# Patient Record
Sex: FEMALE | Race: WHITE | NOT HISPANIC OR LATINO | Employment: OTHER | URBAN - METROPOLITAN AREA
[De-identification: names, ages, dates, MRNs, and addresses within clinical notes are randomized per-mention and may not be internally consistent; named-entity substitution may affect disease eponyms.]

---

## 2017-04-13 ENCOUNTER — OFFICE VISIT (OUTPATIENT)
Dept: FAMILY MEDICINE | Facility: CLINIC | Age: 72
End: 2017-04-13
Payer: MEDICARE

## 2017-04-13 VITALS
RESPIRATION RATE: 18 BRPM | SYSTOLIC BLOOD PRESSURE: 120 MMHG | HEART RATE: 86 BPM | WEIGHT: 136.25 LBS | BODY MASS INDEX: 25.72 KG/M2 | OXYGEN SATURATION: 98 % | TEMPERATURE: 98 F | HEIGHT: 61 IN | DIASTOLIC BLOOD PRESSURE: 84 MMHG

## 2017-04-13 DIAGNOSIS — J30.1 SEASONAL ALLERGIC RHINITIS DUE TO POLLEN: Chronic | ICD-10-CM

## 2017-04-13 DIAGNOSIS — G47.30 SLEEP APNEA, UNSPECIFIED TYPE: Primary | ICD-10-CM

## 2017-04-13 DIAGNOSIS — E78.00 HYPERCHOLESTEROLEMIA: Chronic | ICD-10-CM

## 2017-04-13 DIAGNOSIS — F33.1 MODERATE EPISODE OF RECURRENT MAJOR DEPRESSIVE DISORDER: Chronic | ICD-10-CM

## 2017-04-13 DIAGNOSIS — K59.03 DRUG-INDUCED CONSTIPATION: Chronic | ICD-10-CM

## 2017-04-13 DIAGNOSIS — I10 ESSENTIAL HYPERTENSION: Chronic | ICD-10-CM

## 2017-04-13 DIAGNOSIS — F51.04 PSYCHOPHYSIOLOGICAL INSOMNIA: ICD-10-CM

## 2017-04-13 DIAGNOSIS — F41.9 ANXIETY: Chronic | ICD-10-CM

## 2017-04-13 PROCEDURE — 99204 OFFICE O/P NEW MOD 45 MIN: CPT | Mod: S$GLB,,, | Performed by: FAMILY MEDICINE

## 2017-04-13 PROCEDURE — 1125F AMNT PAIN NOTED PAIN PRSNT: CPT | Mod: S$GLB,,, | Performed by: FAMILY MEDICINE

## 2017-04-13 PROCEDURE — 3074F SYST BP LT 130 MM HG: CPT | Mod: S$GLB,,, | Performed by: FAMILY MEDICINE

## 2017-04-13 PROCEDURE — 3079F DIAST BP 80-89 MM HG: CPT | Mod: S$GLB,,, | Performed by: FAMILY MEDICINE

## 2017-04-13 PROCEDURE — 99999 PR PBB SHADOW E&M-NEW PATIENT-LVL IV: CPT | Mod: PBBFAC,,, | Performed by: FAMILY MEDICINE

## 2017-04-13 PROCEDURE — 1159F MED LIST DOCD IN RCRD: CPT | Mod: S$GLB,,, | Performed by: FAMILY MEDICINE

## 2017-04-13 PROCEDURE — 1160F RVW MEDS BY RX/DR IN RCRD: CPT | Mod: S$GLB,,, | Performed by: FAMILY MEDICINE

## 2017-04-13 PROCEDURE — 1157F ADVNC CARE PLAN IN RCRD: CPT | Mod: S$GLB,,, | Performed by: FAMILY MEDICINE

## 2017-04-13 RX ORDER — LUBIPROSTONE 24 UG/1
24 CAPSULE, GELATIN COATED ORAL 2 TIMES DAILY WITH MEALS
Qty: 30 CAPSULE | Refills: 0 | Status: SHIPPED | OUTPATIENT
Start: 2017-04-13 | End: 2017-04-13 | Stop reason: SDUPTHER

## 2017-04-13 RX ORDER — LORAZEPAM 1 MG/1
1 TABLET ORAL 2 TIMES DAILY
Qty: 60 TABLET | Refills: 0 | Status: SHIPPED | OUTPATIENT
Start: 2017-04-13 | End: 2017-04-13 | Stop reason: SDUPTHER

## 2017-04-13 RX ORDER — LUBIPROSTONE 24 UG/1
24 CAPSULE, GELATIN COATED ORAL 2 TIMES DAILY WITH MEALS
COMMUNITY
Start: 2017-03-15 | End: 2017-04-13 | Stop reason: SDUPTHER

## 2017-04-13 RX ORDER — PANTOPRAZOLE SODIUM 40 MG/1
40 TABLET, DELAYED RELEASE ORAL DAILY
COMMUNITY
Start: 2017-02-17 | End: 2017-04-13 | Stop reason: SDUPTHER

## 2017-04-13 RX ORDER — MONTELUKAST SODIUM 10 MG/1
10 TABLET ORAL DAILY
Qty: 90 TABLET | Refills: 3 | Status: SHIPPED | OUTPATIENT
Start: 2017-04-13 | End: 2018-03-12 | Stop reason: SDUPTHER

## 2017-04-13 RX ORDER — VENLAFAXINE HYDROCHLORIDE 75 MG/1
225 CAPSULE, EXTENDED RELEASE ORAL DAILY
Qty: 270 CAPSULE | Refills: 3 | Status: SHIPPED | OUTPATIENT
Start: 2017-04-13 | End: 2017-04-17 | Stop reason: SDUPTHER

## 2017-04-13 RX ORDER — LUBIPROSTONE 24 UG/1
24 CAPSULE, GELATIN COATED ORAL 2 TIMES DAILY WITH MEALS
Qty: 90 CAPSULE | Refills: 3 | Status: SHIPPED | OUTPATIENT
Start: 2017-04-13 | End: 2017-04-17 | Stop reason: SDUPTHER

## 2017-04-13 RX ORDER — ATORVASTATIN CALCIUM 40 MG/1
40 TABLET, FILM COATED ORAL DAILY
Qty: 90 TABLET | Refills: 3 | Status: SHIPPED | OUTPATIENT
Start: 2017-04-13 | End: 2018-04-22 | Stop reason: SDUPTHER

## 2017-04-13 RX ORDER — CARVEDILOL 12.5 MG/1
12.5 TABLET ORAL 2 TIMES DAILY
COMMUNITY
Start: 2017-02-15 | End: 2017-04-13 | Stop reason: SDUPTHER

## 2017-04-13 RX ORDER — TRAMADOL HYDROCHLORIDE 50 MG/1
TABLET ORAL
COMMUNITY
Start: 2017-02-28

## 2017-04-13 RX ORDER — VALSARTAN AND HYDROCHLOROTHIAZIDE 320; 25 MG/1; MG/1
1 TABLET, FILM COATED ORAL DAILY
Qty: 90 TABLET | Refills: 3 | Status: SHIPPED | OUTPATIENT
Start: 2017-04-13 | End: 2018-03-20 | Stop reason: SDUPTHER

## 2017-04-13 RX ORDER — LORAZEPAM 1 MG/1
1 TABLET ORAL 2 TIMES DAILY
COMMUNITY
Start: 2017-03-15 | End: 2017-04-13 | Stop reason: SDUPTHER

## 2017-04-13 RX ORDER — VENLAFAXINE HYDROCHLORIDE 75 MG/1
225 CAPSULE, EXTENDED RELEASE ORAL DAILY
Qty: 90 CAPSULE | Refills: 0 | Status: SHIPPED | OUTPATIENT
Start: 2017-04-13 | End: 2017-04-13 | Stop reason: SDUPTHER

## 2017-04-13 RX ORDER — LORAZEPAM 1 MG/1
1 TABLET ORAL 2 TIMES DAILY
Qty: 180 TABLET | Refills: 1 | Status: SHIPPED | OUTPATIENT
Start: 2017-04-13 | End: 2017-09-06 | Stop reason: SDUPTHER

## 2017-04-13 RX ORDER — CARVEDILOL 12.5 MG/1
12.5 TABLET ORAL 2 TIMES DAILY
Qty: 90 TABLET | Refills: 3 | Status: SHIPPED | OUTPATIENT
Start: 2017-04-13 | End: 2017-09-08 | Stop reason: SDUPTHER

## 2017-04-13 RX ORDER — PANTOPRAZOLE SODIUM 40 MG/1
40 TABLET, DELAYED RELEASE ORAL DAILY
Qty: 90 TABLET | Refills: 3 | Status: SHIPPED | OUTPATIENT
Start: 2017-04-13 | End: 2018-03-12 | Stop reason: SDUPTHER

## 2017-04-13 RX ORDER — MONTELUKAST SODIUM 10 MG/1
10 TABLET ORAL DAILY
COMMUNITY
Start: 2017-03-22 | End: 2017-04-13 | Stop reason: SDUPTHER

## 2017-04-13 RX ORDER — VENLAFAXINE HYDROCHLORIDE 150 MG/1
1 CAPSULE, EXTENDED RELEASE ORAL DAILY
COMMUNITY
Start: 2017-01-29 | End: 2017-04-13 | Stop reason: SDUPTHER

## 2017-04-13 RX ORDER — ATORVASTATIN CALCIUM 40 MG/1
40 TABLET, FILM COATED ORAL DAILY
COMMUNITY
Start: 2017-02-24 | End: 2017-04-13 | Stop reason: SDUPTHER

## 2017-04-13 RX ORDER — VALSARTAN AND HYDROCHLOROTHIAZIDE 320; 25 MG/1; MG/1
1 TABLET, FILM COATED ORAL DAILY
COMMUNITY
Start: 2017-01-25 | End: 2017-04-13 | Stop reason: SDUPTHER

## 2017-04-13 NOTE — PATIENT INSTRUCTIONS
Allergic Rhinitis  Allergic rhinitis is an allergic reaction that affects the nose, and often the eyes. Its often known as nasal allergies. Nasal allergies are often due to things in the environment that are breathed in. Depending what you are sensitive to, nasal allergies may occur only during certain seasons. Or they may occur year round. Common indoor allergens include house dust mites, mold, cockroaches, and pet dander. Outdoor allergens include pollen from trees, grasses, and weeds.   Symptoms include a drippy, stuffy, and itchy nose. They also include sneezing and red and itchy eyes. You may feel tired more often. Severe allergies may also affect your breathing and trigger a condition called asthma.   Tests can be done to see what allergens are affecting you. You may be referred to an allergy specialist for testing and further evaluation.  Home care  The healthcare provider may prescribe medicines to help relieve allergy symptoms.   Ask the provider for advice on how to avoid substances that you are allergic to. Below are a few tips for each type of allergen.  Pet dander:  · Do not have pets with fur and feathers.  · If you cannot avoid having a pet, keep it out of your bedroom and off upholstered furniture.  Pollen:  · When pollen counts are high, keep windows of your car and home closed. If possible, use an air conditioner instead.  · Wear a filter mask when mowing or doing yard work.  House dust mites:  · Wash bedding every week in warm water and detergent and dry on a hot setting.  · Cover the mattress, box spring, and pillows with allergy covers.   · If possible, sleep in a room with no carpet, curtains, or upholstered furniture.  Cockroaches:  · Store food in sealed containers.  · Remove garbage from the home promptly.  · Fix water leaks  Mold:  · Keep humidity low by using a dehumidifier or air conditioner. Keep the dehumidifier and air conditioner clean and free of mold.  · Clean moldy areas with  bleach and water.  In general:  · Vacuum once or twice a week. If possible, use a vacuum with a high-efficiency particulate air (HEPA) filter.  · Do not smoke. Avoid cigarette smoke. Cigarette smoke is an irritant that can make symptoms worse.  Follow-up care  Follow up as advised by the health care provider or our staff. If you were referred to an allergy specialist, make this appointment promptly.  When to seek medical advice  Call your healthcare provider right away if the following occur:  · Coughing or wheezing  · Fever greater than 100.4°F (38°C)  · Continuing symptoms, new symptoms, or worsening symptoms  Call 911 right away if you have:  · Trouble breathing  · Hives (raised red bumps)  · Severe swelling of the face or severe itching of the eyes or mouth  Date Last Reviewed: 4/26/2015  © 5285-0817 [employer]. [address] [place] [address], [place] [address]. All rights reserved. This information is not intended as a substitute for professional medical care. Always follow your healthcare professional's instructions.

## 2017-04-13 NOTE — MR AVS SNAPSHOT
[facility]  [address]  [place] [address]  Phone: [phone]                  Yara Pradhan   2017 11:00 AM   Office Visit    Description:  Female : 1945   Provider:  Irina Womack MD   Department:  [facility]           Reason for Visit     Establish Care     Medication Refill           Diagnoses this Visit        Comments    Sleep apnea, unspecified type    -  Primary     Essential hypertension         Moderate episode of recurrent major depressive disorder         Seasonal allergic rhinitis due to pollen         Drug-induced constipation         Anxiety         Hypercholesterolemia                To Do List           Goals (5 Years of Data)     None       These Medications        Disp Refills Start End    AMITIZA 24 mcg Cap 90 capsule 3 2017     Take 1 capsule (24 mcg total) by mouth 2 (two) times daily with meals. - Oral    Pharmacy: [facility] - [address] Ph #: [phone]       atorvastatin (LIPITOR) 40 MG tablet 90 tablet 3 2017     Take 1 tablet (40 mg total) by mouth once daily. - Oral    Pharmacy: [facility] - [address] Ph #: [phone]       carvedilol (COREG) 12.5 MG tablet 90 tablet 3 2017     Take 1 tablet (12.5 mg total) by mouth 2 (two) times daily. - Oral    Pharmacy: [facility] - [address] Ph #: [phone]       lorazepam (ATIVAN) 1 MG tablet 180 tablet 1 2017     Take 1 tablet (1 mg total) by mouth 2 (two) times daily. - Oral    Pharmacy: [facility] - [address] Ph #: [phone]       montelukast (SINGULAIR) 10 mg tablet 90 tablet 3 2017     Take 1 tablet (10 mg total) by mouth once daily. - Oral    Pharmacy: [facility] - [address] Ph #: [phone]        pantoprazole (PROTONIX) 40 MG tablet 90 tablet 3 4/13/2017     Take 1 tablet (40 mg total) by mouth once daily. - Oral    Pharmacy: [facility] - [facility] [address] #: [phone]       valsartan-hydrochlorothiazide (DIOVAN-HCT) 320-25 mg per tablet 90 tablet 3 4/13/2017     Take 1 tablet by mouth once daily. - Oral    Pharmacy: [facility] - [facility] [address] Ph #: [phone]       venlafaxine (EFFEXOR-XR) 75 MG 24 hr capsule 270 capsule 3 4/13/2017     Take 3 capsules (225 mg total) by mouth once daily. - Oral    Pharmacy: [facility] - [facility] [address] #: [phone]         [facility]     [facility] On Call Nurse Care Line - 24/7 Assistance  Unless otherwise directed by your provider, please contact [facility], our nurse care line that is available for 24/7 assistance.     Registered nurses in the [facility] provide: appointment scheduling, clinical advisement, health education, and other advisory services.  Call: [phone] (toll free)               Medications           Message regarding Medications     Verify the changes and/or additions to your medication regime listed below are the same as discussed with your clinician today.  If any of these changes or additions are incorrect, please notify your healthcare provider.        START taking these NEW medications        Refills    AMITIZA 24 mcg Cap 3    Sig: Take 1 capsule (24 mcg total) by mouth 2 (two) times daily with meals.    Class: Print    Route: Oral    atorvastatin (LIPITOR) 40 MG tablet 3    Sig: Take 1 tablet (40 mg total) by mouth once daily.    Class: Print    Route: Oral    carvedilol (COREG) 12.5 MG tablet 3    Sig: Take 1 tablet (12.5 mg total) by mouth 2 (two) times daily.    Class: Normal    Route: Oral    lorazepam (ATIVAN) 1 MG tablet 1    Sig: Take 1 tablet (1 mg total) by mouth 2 (two) times daily.  "   Class: Print    Route: Oral    montelukast (SINGULAIR) 10 mg tablet 3    Sig: Take 1 tablet (10 mg total) by mouth once daily.    Class: Print    Route: Oral    pantoprazole (PROTONIX) 40 MG tablet 3    Sig: Take 1 tablet (40 mg total) by mouth once daily.    Class: Print    Route: Oral    valsartan-hydrochlorothiazide (DIOVAN-HCT) 320-25 mg per tablet 3    Sig: Take 1 tablet by mouth once daily.    Class: Print    Route: Oral    venlafaxine (EFFEXOR-XR) 75 MG 24 hr capsule 3    Sig: Take 3 capsules (225 mg total) by mouth once daily.    Class: Print    Route: Oral           Verify that the below list of medications is an accurate representation of the medications you are currently taking.  If none reported, the list may be blank. If incorrect, please contact your healthcare provider. Carry this list with you in case of emergency.           Current Medications     AMITIZA 24 mcg Cap Take 1 capsule (24 mcg total) by mouth 2 (two) times daily with meals.    atorvastatin (LIPITOR) 40 MG tablet Take 1 tablet (40 mg total) by mouth once daily.    carvedilol (COREG) 12.5 MG tablet Take 1 tablet (12.5 mg total) by mouth 2 (two) times daily.    lorazepam (ATIVAN) 1 MG tablet Take 1 tablet (1 mg total) by mouth 2 (two) times daily.    montelukast (SINGULAIR) 10 mg tablet Take 1 tablet (10 mg total) by mouth once daily.    pantoprazole (PROTONIX) 40 MG tablet Take 1 tablet (40 mg total) by mouth once daily.    tramadol (ULTRAM) 50 mg tablet     valsartan-hydrochlorothiazide (DIOVAN-HCT) 320-25 mg per tablet Take 1 tablet by mouth once daily.    venlafaxine (EFFEXOR-XR) 75 MG 24 hr capsule Take 3 capsules (225 mg total) by mouth once daily.           Clinical Reference Information           Your Vitals Were     BP Pulse Temp Resp Height Weight    120/84 86 97.7 °F (36.5 °C) (Tympanic) 18 5' 0.5" (1.537 m) 61.8 kg (136 lb 3.9 oz)    SpO2 BMI             98% 26.17 kg/m2         Blood Pressure          Most Recent Value    BP  " 120/84      Allergies as of 4/13/2017     Lortab [Hydrocodone-acetaminophen]    Codeine    Percocet [Oxycodone-acetaminophen]      Immunizations Administered on Date of Encounter - 4/13/2017     None      Orders Placed During Today's Visit      Normal Orders This Visit    Ambulatory consult to Sleep Disorders       [facility] Sign-Up     Activating your [facility] account is as easy as 1-2-3!     1) Visit [email or web address], select Sign Up Now, enter this activation code and your date of birth, then select Next.  [email or web address]  Expires: 5/28/2017 12:33 PM      2) Create a username and password to use when you visit [facility] in the future and select a security question in case you lose your password and select Next.    3) Enter your e-mail address and click Sign Up!    Additional Information  If you have questions, please e-mail [email or web address] or call [phone] to talk to our [facility] staff. Remember, [facility] is NOT to be used for urgent needs. For medical emergencies, dial 911.         Smoking Cessation     If you would like to quit smoking:   You may be eligible for free services if you are a [place] resident and started smoking cigarettes before September 1, 1988.  Call the Smoking Cessation Trust (SCT) toll free at (992) 146-3529 or (633) 417-0256.   Call 1-800-QUIT-NOW if you do not meet the above criteria.   Contact us via email: [email or web address] ([employer])   View our website for more information: [email or web address]        Language Assistance Services     ATTENTION: Language assistance services are available, free of charge. Please call [phone].      ATENCIÓN: Si habla español, tiene a peralta disposición servicios gratuitos de asistencia lingüística. Llame al [phone].     CHÚ Ý: N?u b?n nói Ti?ng Vi?t, có các d?ch v? h? tr? ngôn ng? mi?n phí dành cho b?n. G?i s? [phone].         [facility] complies with applicable Federal civil rights laws and does not  discriminate on the basis of race, color, national origin, age, disability, or sex.
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
## 2017-04-17 DIAGNOSIS — F33.1 MODERATE EPISODE OF RECURRENT MAJOR DEPRESSIVE DISORDER: Chronic | ICD-10-CM

## 2017-04-17 DIAGNOSIS — K59.03 DRUG-INDUCED CONSTIPATION: Chronic | ICD-10-CM

## 2017-04-17 RX ORDER — VENLAFAXINE HYDROCHLORIDE 75 MG/1
225 CAPSULE, EXTENDED RELEASE ORAL DAILY
Qty: 90 CAPSULE | Refills: 0 | Status: SHIPPED | OUTPATIENT
Start: 2017-04-17 | End: 2018-06-25 | Stop reason: SDUPTHER

## 2017-04-17 RX ORDER — LUBIPROSTONE 24 UG/1
24 CAPSULE, GELATIN COATED ORAL 2 TIMES DAILY WITH MEALS
Qty: 30 CAPSULE | Refills: 0 | Status: SHIPPED | OUTPATIENT
Start: 2017-04-17 | End: 2017-05-17

## 2017-04-17 NOTE — TELEPHONE ENCOUNTER
----- Message from Ebony Mojica sent at 4/17/2017  8:48 AM CDT -----  Pt at [phone]//states she saw  last week and 2 prescriptions were suppose to be called in//pharmacy has not received them//med for constipation(does not remember name) and Effexor//uses//[facility] [address]//please call//janice/korina

## 2017-05-15 ENCOUNTER — OFFICE VISIT (OUTPATIENT)
Dept: PULMONOLOGY | Facility: CLINIC | Age: 72
End: 2017-05-15
Payer: MEDICARE

## 2017-05-15 VITALS
SYSTOLIC BLOOD PRESSURE: 108 MMHG | HEART RATE: 56 BPM | RESPIRATION RATE: 18 BRPM | DIASTOLIC BLOOD PRESSURE: 64 MMHG | BODY MASS INDEX: 27.52 KG/M2 | WEIGHT: 140.19 LBS | HEIGHT: 60 IN | OXYGEN SATURATION: 97 %

## 2017-05-15 DIAGNOSIS — E78.00 HYPERCHOLESTEROLEMIA: ICD-10-CM

## 2017-05-15 DIAGNOSIS — I10 ESSENTIAL HYPERTENSION: ICD-10-CM

## 2017-05-15 DIAGNOSIS — G47.33 OBSTRUCTIVE SLEEP APNEA SYNDROME: Primary | ICD-10-CM

## 2017-05-15 DIAGNOSIS — F33.1 MODERATE EPISODE OF RECURRENT MAJOR DEPRESSIVE DISORDER: ICD-10-CM

## 2017-05-15 DIAGNOSIS — F51.04 PSYCHOPHYSIOLOGICAL INSOMNIA: ICD-10-CM

## 2017-05-15 DIAGNOSIS — F17.200 TOBACCO USE DISORDER: ICD-10-CM

## 2017-05-15 PROCEDURE — 3074F SYST BP LT 130 MM HG: CPT | Mod: S$GLB,,, | Performed by: NURSE PRACTITIONER

## 2017-05-15 PROCEDURE — 99999 PR PBB SHADOW E&M-EST. PATIENT-LVL IV: CPT | Mod: PBBFAC,,, | Performed by: NURSE PRACTITIONER

## 2017-05-15 PROCEDURE — 1159F MED LIST DOCD IN RCRD: CPT | Mod: S$GLB,,, | Performed by: NURSE PRACTITIONER

## 2017-05-15 PROCEDURE — 1160F RVW MEDS BY RX/DR IN RCRD: CPT | Mod: S$GLB,,, | Performed by: NURSE PRACTITIONER

## 2017-05-15 PROCEDURE — 99214 OFFICE O/P EST MOD 30 MIN: CPT | Mod: S$GLB,,, | Performed by: NURSE PRACTITIONER

## 2017-05-15 PROCEDURE — 3078F DIAST BP <80 MM HG: CPT | Mod: S$GLB,,, | Performed by: NURSE PRACTITIONER

## 2017-05-15 RX ORDER — TITANIUM DIOXIDE, OCTINOXATE, ZINC OXIDE 4.61; 1.6; .78 G/40ML; G/40ML; G/40ML
1 CREAM TOPICAL
COMMUNITY

## 2017-05-15 RX ORDER — ASPIRIN 81 MG/1
81 TABLET ORAL DAILY
COMMUNITY

## 2017-05-15 RX ORDER — AMOXICILLIN 500 MG
2 CAPSULE ORAL DAILY
COMMUNITY

## 2017-05-15 RX ORDER — DOXYCYCLINE HYCLATE 200 MG/1
200 TABLET, DELAYED RELEASE ORAL 2 TIMES DAILY
COMMUNITY
End: 2018-09-27

## 2017-05-15 RX ORDER — CHOLECALCIFEROL (VITAMIN D3) 25 MCG
1000 TABLET ORAL DAILY
COMMUNITY

## 2017-05-15 RX ORDER — ASCORBIC ACID 500 MG
500 TABLET ORAL DAILY
COMMUNITY

## 2017-05-15 NOTE — PATIENT INSTRUCTIONS
Continuous Positive Air Pressure (CPAP)  Your provider has scheduled you for a sleep study.   You should be receiving a phone call from the sleep lab shortly after your study has been approved by your insurance. Please make sure you have your current phone numbers in the [facility] system. If you do not hear from anyone in the next 10 -14 business days, please call the sleep lab at [phone] to schedule your sleep study. The sleep studies are performed at [facility] seven nights a week.  When you are scheduling your sleep study, they will also make you a follow up appointment with your provider. This follow up appointment will be 10-14 days after your sleep study to review the results. If it is noted that you do have sleep apnea on your initial sleep study, you may receive a call back for a second night study with the CPAP before you come back to the office.   Continuous positive air pressure (CPAP) uses gentle air pressure to hold the airway open. CPAP is often the most effective treatment for sleep apnea and severe snoring. It works very well for many people. But keep in mind that it can take several adjustments before the setup is right for you.    How CPAP works  The CPAPmachine  is a small portable pump beside the bed. The pump sends air through a hose, which is held over your nose and mouth by a mask. Mild air pressure is gently pushed through your airway. The air pressure nudges sagging tissues aside. This widens the airway so you can breathe better. CPAP may be combined with other kinds of therapy for sleep apnea.     A mask over the nose gently directs air into the throat to keep the airway open.   Types of air pressure treatments  There are different types of CPAP. Your doctor or CPAP technician will help you decide which type is best for you:  · Basic CPAP keeps the pressure constant all night long.  · A bilevel device (BiPAP) provides more pressure when you breathe in and less when  you breathe out. A BiPAP machine also may be set to provide automatic breaths to maintain breathing if you stop breathing while sleeping.  · An autoCPAP device automatically adjusts pressure throughout the night and in response to changes such as body position, sleep stage, and snoring.  Date Last Reviewed: 8/10/2015  © 7690-4459 [employer]. [address] [place] [address], [place] [address]. All rights reserved. This information is not intended as a substitute for professional medical care. Always follow your healthcare professional's instructions.        What Are Snoring and Obstructive Sleep Apnea?  If youve ever had a stuffed-up nose, you know the feeling of trying to breathe through a very narrow passageway. This is what happens in your throat when you snore. While you sleep, structures in your throat partially block your air passage, making the passage narrow and hard to breathe through. If the entire passage becomes blocked and you cant breathe at all, you have sleep apnea.     Air moves freely through the nose, mouth and throat.   Snoring  If your throat structures are too large or the muscles relax too much during sleep, the air passage may be partially blocked. As air from the nose or mouth passes around this blockage, the throat structures vibrate, causing the familiar sound of snoring. At times, this sound can be so loud that snorers wake up others, or even themselves, during the night. Snoring gets worse as more and more of the air passage is blocked.     Air is blocked in the back of the mouth and throat.   Obstructive sleep apnea  If the structures completely block the throat, air cant flow to the lungs at all. This is called apnea (meaning no breathing). Since the lungs arent getting fresh air, the brain tells the body to wake up just enough to tighten the muscles and unblock the air passage. With a loud gasp, breathing begins again. This process may be repeated over and over again  throughout the night, making your sleep fragmented with a lighter stage of sleep. Even though you do not remember waking up many times during the night to a lighter sleep, you feel tired the next day. The lack of sleep and fresh air can also strain your lungs, heart, and other organs, leading to problems such as high blood pressure, heart attack, or stroke.     Air may not be able to move freely past a deviated septum or swollen turbinates.   Problems in the nose and jaw  Problems in the structure of the nose may obstruct breathing. A crooked (deviated) septum or swollen turbinates can make snoring worse or lead to apnea. Also, a receding jaw may make the tongue sit too far back, so its more likely to block the airway when youre asleep.        Date Last Reviewed: 7/18/2015  © 8425-7852 [employer]. [address] [place] [address], [place] [address]. All rights reserved. This information is not intended as a substitute for professional medical care. Always follow your healthcare professional's instructions.

## 2017-05-15 NOTE — MR AVS SNAPSHOT
[facility]  [address] [place] [id number]  Phone: [phone]  Fax: [phone]                  Yara Pradhan   5/15/2017 9:20 AM   Office Visit    Description:  Female : 1945   Provider:  Jossy Melton NP   Department:  [facility]           Reason for Visit     Snoring     Sleep Apnea           Diagnoses this Visit        Comments    Obstructive sleep apnea syndrome    -  Primary dx in , no sleep report available, no download on 12 yr old cpap machine. 20 lb weight loss in past 1 year. equipment malfunction, proceed with in lab psg    Psychophysiological insomnia     improved on ativan 1 at night.     Moderate episode of recurrent major depressive disorder     controlled on effexor-xr 75mg, followed by pcp.     Essential hypertension     controlled on current medication, followed by pcp    Hypercholesterolemia     followed by pcp      Tobacco use disorder     continues to be current everyday smoker 3/4 pack/day. not ready to quit, declines referral to cessation program at this time.              To Do List           Future Appointments        Provider Department Dept Phone    2017 10:00 AM Naima Womack MD [facility] [phone]      Goals (5 Years of Data)     None      Follow-Up and Disposition     Return for Sleep Study Follow Up.      [facility]     [facility] On [facility] Nurse Care Line -  Assistance  Unless otherwise directed by your provider, please contact [facility], our nurse care line that is available for / assistance.     Registered nurses in the [facility] Center provide: appointment scheduling, clinical advisement, health education, and other advisory services.  Call: [phone] (toll free)               Medications           Message regarding Medications     Verify the changes and/or additions to your medication regime listed below are the same as discussed with your  clinician today.  If any of these changes or additions are incorrect, please notify your healthcare provider.             Verify that the below list of medications is an accurate representation of the medications you are currently taking.  If none reported, the list may be blank. If incorrect, please contact your healthcare provider. Carry this list with you in case of emergency.           Current Medications     AMITIZA 24 mcg Cap Take 1 capsule (24 mcg total) by mouth 2 (two) times daily with meals.    ascorbic acid, vitamin C, (VITAMIN C) 500 MG tablet Take 500 mg by mouth once daily.    aspirin (ECOTRIN) 81 MG EC tablet Take 81 mg by mouth once daily.    atorvastatin (LIPITOR) 40 MG tablet Take 1 tablet (40 mg total) by mouth once daily.    carvedilol (COREG) 12.5 MG tablet Take 1 tablet (12.5 mg total) by mouth 2 (two) times daily.    cranberry 400 mg Cap Take 1 tablet by mouth.    doxycycline hyclate (DORYX) 200 mg EC tablet Take 200 mg by mouth 2 (two) times daily.    fish oil-omega-3 fatty acids 300-1,000 mg capsule Take 2 g by mouth once daily.    Lactobacillus rhamnosus GG (CULTURELLE) 10 billion cell capsule Take 1 capsule by mouth once daily.    lorazepam (ATIVAN) 1 MG tablet Take 1 tablet (1 mg total) by mouth 2 (two) times daily.    montelukast (SINGULAIR) 10 mg tablet Take 1 tablet (10 mg total) by mouth once daily.    pantoprazole (PROTONIX) 40 MG tablet Take 1 tablet (40 mg total) by mouth once daily.    tramadol (ULTRAM) 50 mg tablet     valsartan-hydrochlorothiazide (DIOVAN-HCT) 320-25 mg per tablet Take 1 tablet by mouth once daily.    venlafaxine (EFFEXOR-XR) 75 MG 24 hr capsule Take 3 capsules (225 mg total) by mouth once daily.    vitamin D (VITAMIN D3) 1000 units Tab Take 1,000 Units by mouth once daily.           Clinical Reference Information           Your Vitals Were     BP Pulse Resp Height Weight SpO2    108/64 (BP Location: Left arm, Patient Position: Sitting, BP Method: Manual) 56 18  5' (1.524 m) 63.6 kg (140 lb 3.4 oz) 97%    BMI                27.38 kg/m2          Blood Pressure          Most Recent Value    BP  108/64      Allergies as of 5/15/2017     Lortab [Hydrocodone-acetaminophen]    Codeine    Percocet [Oxycodone-acetaminophen]      Immunizations Administered on Date of Encounter - 5/15/2017     None      Orders Placed During Today's Visit     Future Labs/Procedures Expected by Expires    Polysomnogram (CPAP will be added if patient meets diagnostic criteria.)  As directed 5/15/2018      [facility] Sign-Up     Activating your [facility] account is as easy as 1-2-3!     1) Visit [email or web address], select Sign Up Now, enter this activation code and your date of birth, then select Next.  [email or web address]  Expires: 5/28/2017 12:33 PM      2) Create a username and password to use when you visit [facility] in the future and select a security question in case you lose your password and select Next.    3) Enter your e-mail address and click Sign Up!    Additional Information  If you have questions, please e-mail [email or web address] or call [phone] to talk to our [facility] staff. Remember, [facility] is NOT to be used for urgent needs. For medical emergencies, dial 911.         Instructions      Continuous Positive Air Pressure (CPAP)  Your provider has scheduled you for a sleep study.   You should be receiving a phone call from the sleep lab shortly after your study has been approved by your insurance. Please make sure you have your current phone numbers in the [facility] system. If you do not hear from anyone in the next 10 -14 business days, please call the sleep lab at [phone] to schedule your sleep study. The sleep studies are performed at [facility] seven nights a week.  When you are scheduling your sleep study, they will also make you a follow up appointment with your provider. This follow up appointment will be 10-14 days after your sleep study to review the  results. If it is noted that you do have sleep apnea on your initial sleep study, you may receive a call back for a second night study with the CPAP before you come back to the office.   Continuous positive air pressure (CPAP) uses gentle air pressure to hold the airway open. CPAP is often the most effective treatment for sleep apnea and severe snoring. It works very well for many people. But keep in mind that it can take several adjustments before the setup is right for you.    How CPAP works  The CPAPmachine  is a small portable pump beside the bed. The pump sends air through a hose, which is held over your nose and mouth by a mask. Mild air pressure is gently pushed through your airway. The air pressure nudges sagging tissues aside. This widens the airway so you can breathe better. CPAP may be combined with other kinds of therapy for sleep apnea.     A mask over the nose gently directs air into the throat to keep the airway open.   Types of air pressure treatments  There are different types of CPAP. Your doctor or CPAP technician will help you decide which type is best for you:  · Basic CPAP keeps the pressure constant all night long.  · A bilevel device (BiPAP) provides more pressure when you breathe in and less when you breathe out. A BiPAP machine also may be set to provide automatic breaths to maintain breathing if you stop breathing while sleeping.  · An autoCPAP device automatically adjusts pressure throughout the night and in response to changes such as body position, sleep stage, and snoring.  Date Last Reviewed: 8/10/2015  © 1023-9896 [employer] [address] [place] [address], [place] [address]. All rights reserved. This information is not intended as a substitute for professional medical care. Always follow your healthcare professional's instructions.        What Are Snoring and Obstructive Sleep Apnea?  If youve ever had a stuffed-up nose, you know the feeling of trying to breathe through a  very narrow passageway. This is what happens in your throat when you snore. While you sleep, structures in your throat partially block your air passage, making the passage narrow and hard to breathe through. If the entire passage becomes blocked and you cant breathe at all, you have sleep apnea.     Air moves freely through the nose, mouth and throat.   Snoring  If your throat structures are too large or the muscles relax too much during sleep, the air passage may be partially blocked. As air from the nose or mouth passes around this blockage, the throat structures vibrate, causing the familiar sound of snoring. At times, this sound can be so loud that snorers wake up others, or even themselves, during the night. Snoring gets worse as more and more of the air passage is blocked.     Air is blocked in the back of the mouth and throat.   Obstructive sleep apnea  If the structures completely block the throat, air cant flow to the lungs at all. This is called apnea (meaning no breathing). Since the lungs arent getting fresh air, the brain tells the body to wake up just enough to tighten the muscles and unblock the air passage. With a loud gasp, breathing begins again. This process may be repeated over and over again throughout the night, making your sleep fragmented with a lighter stage of sleep. Even though you do not remember waking up many times during the night to a lighter sleep, you feel tired the next day. The lack of sleep and fresh air can also strain your lungs, heart, and other organs, leading to problems such as high blood pressure, heart attack, or stroke.     Air may not be able to move freely past a deviated septum or swollen turbinates.   Problems in the nose and jaw  Problems in the structure of the nose may obstruct breathing. A crooked (deviated) septum or swollen turbinates can make snoring worse or lead to apnea. Also, a receding jaw may make the tongue sit too far back, so its more likely to  block the airway when youre asleep.        Date Last Reviewed: 7/18/2015  © 4394-6097 [employer]. [address] [place] [address], [place] [address]. All rights reserved. This information is not intended as a substitute for professional medical care. Always follow your healthcare professional's instructions.             Smoking Cessation     If you would like to quit smoking:   You may be eligible for free services if you are a [place] resident and started smoking cigarettes before September 1, 1988.  Call the Smoking Cessation Trust ([facility]) toll free at [phone] or [phone].   Call 3-107-QUIT-NOW if you do not meet the above criteria.   Contact us via email: [email or web address]   View our website for more information: [email or web address]        Language Assistance Services     ATTENTION: Language assistance services are available, free of charge. Please call [phone].      ATENCIÓN: Si habla español, tiene a peralta disposición servicios gratuitos de asistencia lingüística. Llame al [phone].     CHÚ Ý: N?u b?n nói Ti?ng Vi?t, có các d?ch v? h? tr? ngôn ng? mi?n phí dành cho b?n. G?i s? [phone].         [facility] complies with applicable Federal civil rights laws and does not discriminate on the basis of race, color, national origin, age, disability, or sex.

## 2017-05-15 NOTE — ASSESSMENT & PLAN NOTE
continues to be current everyday smoker 3/4 pack/day. not ready to quit, declines referral to cessation program at this time.

## 2017-05-15 NOTE — LETTER
May 15, 2017      Naima Womack MD  [address]  [facility] [address]           [facility]  [address] [place] [id number]  Phone: [phone]  Fax: [phone]          Patient: Yara Pradhan   MR Number: [id number]   YOB: 1945   Date of Visit: 5/15/2017       Dear Dr. Naima Womack:    Thank you for referring Yara Pradhan to me for evaluation. Attached you will find relevant portions of my assessment and plan of care.    If you have questions, please do not hesitate to call me. I look forward to following Yara Pradhan along with you.    Sincerely,    Jossy Melton, NP    Enclosure  CC:  No Recipients    If you would like to receive this communication electronically, please contact [email or web address] or [phone] to request more information on [employer] access.    For providers and/or their staff who would like to refer a patient to [facility], please contact us through our one-stop-shop provider referral line, [facility], at [phone].    If you feel you have received this communication in error or would no longer like to receive these types of communications, please e-mail [email or web address]
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
## 2017-05-15 NOTE — ASSESSMENT & PLAN NOTE
dx in 2005, no sleep report available, no download on 14 yr old cpap machine. 20 lb weight loss in past 1 year. equipment malfunction, proceed with in lab psg to determine if continues to have sleep apnea.

## 2017-05-25 ENCOUNTER — LAB VISIT (OUTPATIENT)
Dept: LAB | Facility: HOSPITAL | Age: 72
End: 2017-05-25
Attending: FAMILY MEDICINE
Payer: MEDICARE

## 2017-05-25 ENCOUNTER — OFFICE VISIT (OUTPATIENT)
Dept: FAMILY MEDICINE | Facility: CLINIC | Age: 72
End: 2017-05-25
Payer: MEDICARE

## 2017-05-25 VITALS
HEART RATE: 70 BPM | TEMPERATURE: 97 F | HEIGHT: 61 IN | WEIGHT: 140.44 LBS | RESPIRATION RATE: 18 BRPM | OXYGEN SATURATION: 98 % | SYSTOLIC BLOOD PRESSURE: 102 MMHG | BODY MASS INDEX: 26.51 KG/M2 | DIASTOLIC BLOOD PRESSURE: 68 MMHG

## 2017-05-25 DIAGNOSIS — G47.33 OBSTRUCTIVE SLEEP APNEA SYNDROME: ICD-10-CM

## 2017-05-25 DIAGNOSIS — Z11.59 NEED FOR HEPATITIS C SCREENING TEST: ICD-10-CM

## 2017-05-25 DIAGNOSIS — K05.10 GINGIVITIS: ICD-10-CM

## 2017-05-25 DIAGNOSIS — E78.00 HYPERCHOLESTEROLEMIA: Chronic | ICD-10-CM

## 2017-05-25 DIAGNOSIS — F33.1 MODERATE EPISODE OF RECURRENT MAJOR DEPRESSIVE DISORDER: Chronic | ICD-10-CM

## 2017-05-25 DIAGNOSIS — I10 ESSENTIAL HYPERTENSION: Primary | Chronic | ICD-10-CM

## 2017-05-25 DIAGNOSIS — F51.04 PSYCHOPHYSIOLOGICAL INSOMNIA: ICD-10-CM

## 2017-05-25 DIAGNOSIS — Z12.31 OTHER SCREENING MAMMOGRAM: ICD-10-CM

## 2017-05-25 DIAGNOSIS — Z78.0 POSTMENOPAUSAL: ICD-10-CM

## 2017-05-25 DIAGNOSIS — Z23 NEED FOR SHINGLES VACCINE: ICD-10-CM

## 2017-05-25 LAB
ALBUMIN SERPL BCP-MCNC: 3.3 G/DL
ALP SERPL-CCNC: 119 U/L
ALT SERPL W/O P-5'-P-CCNC: 19 U/L
ANION GAP SERPL CALC-SCNC: 9 MMOL/L
AST SERPL-CCNC: 26 U/L
BILIRUB SERPL-MCNC: 0.7 MG/DL
BUN SERPL-MCNC: 15 MG/DL
CALCIUM SERPL-MCNC: 9.5 MG/DL
CHLORIDE SERPL-SCNC: 105 MMOL/L
CHOLEST/HDLC SERPL: 2.7 {RATIO}
CO2 SERPL-SCNC: 29 MMOL/L
CREAT SERPL-MCNC: 0.9 MG/DL
EST. GFR  (AFRICAN AMERICAN): >60 ML/MIN/1.73 M^2
EST. GFR  (NON AFRICAN AMERICAN): >60 ML/MIN/1.73 M^2
GLUCOSE SERPL-MCNC: 78 MG/DL
HDL/CHOLESTEROL RATIO: 36.8 %
HDLC SERPL-MCNC: 152 MG/DL
HDLC SERPL-MCNC: 56 MG/DL
LDLC SERPL CALC-MCNC: 75.8 MG/DL
NONHDLC SERPL-MCNC: 96 MG/DL
POTASSIUM SERPL-SCNC: 4.3 MMOL/L
PROT SERPL-MCNC: 6.9 G/DL
SODIUM SERPL-SCNC: 143 MMOL/L
TRIGL SERPL-MCNC: 101 MG/DL

## 2017-05-25 PROCEDURE — 1126F AMNT PAIN NOTED NONE PRSNT: CPT | Mod: S$GLB,,, | Performed by: FAMILY MEDICINE

## 2017-05-25 PROCEDURE — 80053 COMPREHEN METABOLIC PANEL: CPT

## 2017-05-25 PROCEDURE — 36415 COLL VENOUS BLD VENIPUNCTURE: CPT | Mod: PO

## 2017-05-25 PROCEDURE — 86803 HEPATITIS C AB TEST: CPT

## 2017-05-25 PROCEDURE — 99214 OFFICE O/P EST MOD 30 MIN: CPT | Mod: S$GLB,,, | Performed by: FAMILY MEDICINE

## 2017-05-25 PROCEDURE — 99999 PR PBB SHADOW E&M-EST. PATIENT-LVL V: CPT | Mod: PBBFAC,,, | Performed by: FAMILY MEDICINE

## 2017-05-25 PROCEDURE — 1159F MED LIST DOCD IN RCRD: CPT | Mod: S$GLB,,, | Performed by: FAMILY MEDICINE

## 2017-05-25 PROCEDURE — 80061 LIPID PANEL: CPT

## 2017-05-25 NOTE — PROGRESS NOTES
Subjective:      Patient ID: Yara Pradhan is a 71 y.o. female.    Chief Complaint: Follow-up      Past Medical History:   Diagnosis Date    Anxiety 4/13/2017    Drug-induced constipation 4/13/2017    Hypercholesterolemia 4/13/2017    Hypertension     Moderate episode of recurrent major depressive disorder 4/13/2017    Seasonal allergic rhinitis due to pollen 4/13/2017     Past Surgical History:   Procedure Laterality Date    back surgeries, multiple N/A     HYSTERECTOMY      KATHLEEN    LAMINECTOMY THORACIC SPINE W/ PLACEMENT SPINAL CORD STIMULATOR      Dr. Luu manages     Family History   Problem Relation Age of Onset    Heart attack Mother     Heart attack Father      Social History     Social History    Marital status:      Spouse name: N/A    Number of children: N/A    Years of education: N/A     Occupational History    Not on file.     Social History Main Topics    Smoking status: Current Every Day Smoker    Smokeless tobacco: Never Used    Alcohol use Yes    Drug use: Unknown    Sexual activity: Not on file     Other Topics Concern    Not on file     Social History Narrative    No narrative on file       Current Outpatient Prescriptions:     ascorbic acid, vitamin C, (VITAMIN C) 500 MG tablet, Take 500 mg by mouth once daily., Disp: , Rfl:     aspirin (ECOTRIN) 81 MG EC tablet, Take 81 mg by mouth once daily., Disp: , Rfl:     atorvastatin (LIPITOR) 40 MG tablet, Take 1 tablet (40 mg total) by mouth once daily., Disp: 90 tablet, Rfl: 3    carvedilol (COREG) 12.5 MG tablet, Take 1 tablet (12.5 mg total) by mouth 2 (two) times daily., Disp: 90 tablet, Rfl: 3    cranberry 400 mg Cap, Take 1 tablet by mouth., Disp: , Rfl:     doxycycline hyclate (DORYX) 200 mg EC tablet, Take 200 mg by mouth 2 (two) times daily., Disp: , Rfl:     fish oil-omega-3 fatty acids 300-1,000 mg capsule, Take 2 g by mouth once daily., Disp: , Rfl:     Lactobacillus rhamnosus GG (CULTURELLE) 10  billion cell capsule, Take 1 capsule by mouth once daily., Disp: , Rfl:     lorazepam (ATIVAN) 1 MG tablet, Take 1 tablet (1 mg total) by mouth 2 (two) times daily., Disp: 180 tablet, Rfl: 1    montelukast (SINGULAIR) 10 mg tablet, Take 1 tablet (10 mg total) by mouth once daily., Disp: 90 tablet, Rfl: 3    pantoprazole (PROTONIX) 40 MG tablet, Take 1 tablet (40 mg total) by mouth once daily., Disp: 90 tablet, Rfl: 3    tramadol (ULTRAM) 50 mg tablet, , Disp: , Rfl:     valsartan-hydrochlorothiazide (DIOVAN-HCT) 320-25 mg per tablet, Take 1 tablet by mouth once daily., Disp: 90 tablet, Rfl: 3    vitamin D (VITAMIN D3) 1000 units Tab, Take 1,000 Units by mouth once daily., Disp: , Rfl:     venlafaxine (EFFEXOR-XR) 75 MG 24 hr capsule, Take 3 capsules (225 mg total) by mouth once daily., Disp: 90 capsule, Rfl: 0    zoster vaccine live, PF, (ZOSTAVAX, PF,) 19,400 unit/0.65 mL injection, Inject 19,400 Units into the skin once., Disp: 1 vial, Rfl: 0  Review of patient's allergies indicates:   Allergen Reactions    Lortab [hydrocodone-acetaminophen] Anaphylaxis and Swelling    Codeine Itching and Nausea Only    Percocet [oxycodone-acetaminophen] Nausea Only       Review of Systems   Constitutional: Negative for chills and fever.   Respiratory: Negative for cough and shortness of breath.    Cardiovascular: Negative for chest pain and palpitations.   Neurological: Negative for speech difficulty and weakness.   Psychiatric/Behavioral: Positive for sleep disturbance. Negative for decreased concentration and dysphoric mood.     HPI  Depression better controlled.  BP's well controlled.  Insomnia not controlled.  Scheduled for sleep apnea test 6/3/17.  Due for recheck of labs and screening exams today.  Gingivitis treated currently with doxycycline - tolerating well.    Objective:   /68 (BP Location: Right arm, Patient Position: Sitting, BP Method: Manual)   Pulse 70   Temp 97.4 °F (36.3 °C) (Tympanic)   Resp  "18   Ht 5' 1" (1.549 m)   Wt 63.7 kg (140 lb 6.9 oz)   SpO2 98%   BMI 26.53 kg/m²      Physical Exam   Constitutional: She is oriented to person, place, and time. She is cooperative. No distress.   HENT:   Right Ear: Hearing, tympanic membrane and ear canal normal.   Left Ear: Hearing, tympanic membrane and ear canal normal.   Eyes: Conjunctivae and EOM are normal.   Cardiovascular: Normal rate, regular rhythm and normal heart sounds.    Pulmonary/Chest: Effort normal and breath sounds normal.   Musculoskeletal: She exhibits no edema.   Neurological: She is alert and oriented to person, place, and time. No cranial nerve deficit. Coordination and gait normal.   Skin: Skin is warm, dry and intact. No rash noted. She is not diaphoretic. No erythema.   Psychiatric: She has a normal mood and affect. Her speech is normal and behavior is normal.   Nursing note and vitals reviewed.          Assessment:       1. Essential hypertension    2. Hypercholesterolemia    3. Obstructive sleep apnea syndrome    4. Moderate episode of recurrent major depressive disorder    5. Psychophysiological insomnia    6. Gingivitis    7. Other screening mammogram    8. Postmenopausal    9. Need for hepatitis C screening test    10. Need for shingles vaccine            Plan:         Essential hypertension  Comments:  BP well controlled.    Hypercholesterolemia  -     Lipid panel; Future; Expected date: 05/25/2017  -     Comprehensive metabolic panel; Future; Expected date: 05/25/2017    Obstructive sleep apnea syndrome  Comments:  6/3 - sleep study f/u.  Discussed this will likely improve insomnia.      Moderate episode of recurrent major depressive disorder  Comments:  Depression better controlled.  Recheck in 4 months.    Psychophysiological insomnia  Comments:  5 hours of sleep at nightat this time.  Needs treatment on ondina.    Gingivitis  Comments:  Taking doxycycline through dentist.    Other screening mammogram  -     Mammo Digital " Screening Bilat with CAD; Future; Expected date: 05/25/2017    Postmenopausal  -     DXA Bone Density Spine And Hip; Future; Expected date: 05/25/2017    Need for hepatitis C screening test  -     Hepatitis C antibody; Future; Expected date: 05/25/2017    Need for shingles vaccine  -     zoster vaccine live, PF, (ZOSTAVAX, PF,) 19,400 unit/0.65 mL injection; Inject 19,400 Units into the skin once.  Dispense: 1 vial; Refill: 0        Patient Care Team:  Naima Womack MD as PCP - General (Family Medicine)  Chad Luu MD as Consulting Physician (Pain Medicine)  Francisco Amaya MD as Consulting Physician (Gastroenterology)

## 2017-05-25 NOTE — PATIENT INSTRUCTIONS
4 Steps for Eating Healthier  Changing the way you eat can improve your health. It can lower your cholesterol and blood pressure, and help you stay at a healthy weight. Your diet doesnt have to be bland and boring to be healthy. Just watch your calories and follow these steps:    1. Eat fewer unhealthy fats  · Choose more fish and lean meats instead of fatty cuts of meat.  · Skip butter and lard, and use less margarine.  · Pass on foods that have palm, coconut, or hydrogenated oils.  · Eat fewer high-fat dairy foods like cheese, ice cream, and whole milk.  · Get a heart-healthy cookbook and try some low-fat recipes.  2. Go light on salt  · Keep the saltshaker off the table.  · Limit high-salt ingredients, such as soy sauce, bouillon, and garlic salt.  · Instead of adding salt when cooking, season your food with herbs and flavorings. Try lemon, garlic, and onion.  · Limit convenience foods, such as boxed or canned foods and restaurant food.  · Read food labels and choose lower-sodium options.  3. Limit sugar  · Pause before you add sugars to pancakes, cereal, coffee, or tea. This includes white and brown table sugar, syrup, honey, and molasses. Cut your usual amount by half.  · Use non-sugar sweeteners. Stevia, aspartame, and sucralose can satisfy a sweet tooth without adding calories.  · Swap out sugar-filled soda and other drinks. Buy sugar-free or low-calorie beverages. Remember water is always the best choice.  · Read labels and choose foods with less added sugar. Keep in mind that dairy foods and foods with fruit will have some natural sugar.  · Cut the sugar in recipes by 1/3 to 1/2. Boost the flavor with extracts like almond, vanilla, or orange. Or add spices such as cinnamon or nutmeg.  4. Eat more fiber  · Eat fresh fruits and vegetables every day.  · Boost your diet with whole grains. Go for oats, whole-grain rice, and bran.  · Add beans and lentils to your meals.  · Drink more water to match your fiber  increase. This is to help prevent constipation.  Date Last Reviewed: 5/11/2015  © 2623-0222 [employer]. [address] [place] [address], [place] [address]. All rights reserved. This information is not intended as a substitute for professional medical care. Always follow your healthcare professional's instructions.

## 2017-05-26 LAB — HCV AB SERPL QL IA: NEGATIVE

## 2017-06-02 PROBLEM — I65.23 BILATERAL CAROTID ARTERY STENOSIS: Status: ACTIVE | Noted: 2017-06-02

## 2017-06-02 PROBLEM — I73.9 PERIPHERAL VASCULAR DISEASE: Status: ACTIVE | Noted: 2017-06-02

## 2017-06-03 ENCOUNTER — HOSPITAL ENCOUNTER (OUTPATIENT)
Dept: SLEEP MEDICINE | Facility: HOSPITAL | Age: 72
Discharge: HOME | End: 2017-06-03
Attending: FAMILY MEDICINE
Payer: MEDICARE

## 2017-06-03 DIAGNOSIS — F51.04 PSYCHOPHYSIOLOGICAL INSOMNIA: Primary | ICD-10-CM

## 2017-06-03 DIAGNOSIS — R06.83 PRIMARY SNORING: ICD-10-CM

## 2017-06-03 DIAGNOSIS — G47.33 OBSTRUCTIVE SLEEP APNEA SYNDROME: ICD-10-CM

## 2017-06-03 DIAGNOSIS — G47.61 PERIODIC LIMB MOVEMENT DISORDER (PLMD): ICD-10-CM

## 2017-06-03 PROCEDURE — 95811 POLYSOM 6/>YRS CPAP 4/> PARM: CPT | Mod: 26,,, | Performed by: PSYCHOLOGIST

## 2017-06-03 PROCEDURE — 95811 POLYSOM 6/>YRS CPAP 4/> PARM: CPT

## 2017-06-16 ENCOUNTER — TELEPHONE (OUTPATIENT)
Dept: FAMILY MEDICINE | Facility: CLINIC | Age: 72
End: 2017-06-16

## 2017-06-16 ENCOUNTER — HOSPITAL ENCOUNTER (OUTPATIENT)
Dept: RADIOLOGY | Facility: HOSPITAL | Age: 72
Discharge: HOME | End: 2017-06-16
Attending: FAMILY MEDICINE
Payer: MEDICARE

## 2017-06-16 VITALS — HEIGHT: 61 IN | WEIGHT: 140 LBS | BODY MASS INDEX: 26.43 KG/M2

## 2017-06-16 DIAGNOSIS — R92.8 ABNORMAL MAMMOGRAM OF LEFT BREAST: Primary | ICD-10-CM

## 2017-06-16 DIAGNOSIS — Z12.31 OTHER SCREENING MAMMOGRAM: ICD-10-CM

## 2017-06-16 PROCEDURE — 77063 BREAST TOMOSYNTHESIS BI: CPT | Mod: 26,,, | Performed by: RADIOLOGY

## 2017-06-16 PROCEDURE — 77067 SCR MAMMO BI INCL CAD: CPT | Mod: 26,,, | Performed by: RADIOLOGY

## 2017-06-16 PROCEDURE — 77067 SCR MAMMO BI INCL CAD: CPT | Mod: TC

## 2017-06-16 NOTE — TELEPHONE ENCOUNTER
Please call patient and let her know that mmg showed left breast assymetry - will order diagnostic mmg and u/s.  Please schedule.

## 2017-06-19 ENCOUNTER — TELEPHONE (OUTPATIENT)
Dept: RADIOLOGY | Facility: HOSPITAL | Age: 72
End: 2017-06-19

## 2017-06-19 ENCOUNTER — TELEPHONE (OUTPATIENT)
Dept: FAMILY MEDICINE | Facility: CLINIC | Age: 72
End: 2017-06-19

## 2017-06-19 NOTE — TELEPHONE ENCOUNTER
----- Message from Ida Kelsey sent at 6/19/2017 12:34 PM CDT -----  Contact: Pt   Pt called and stated she needed to the nurse. She stated that she needs to know if her medical records were received from [facility]. She can be reached at [phone].    Thanks,  TF

## 2017-06-19 NOTE — TELEPHONE ENCOUNTER
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 Pt voiced understanding,

## 2017-06-20 NOTE — TELEPHONE ENCOUNTER
Spoke with pt about results with understanding and appts scheduled for [facility] and u/s.

## 2017-06-22 ENCOUNTER — OFFICE VISIT (OUTPATIENT)
Dept: PULMONOLOGY | Facility: CLINIC | Age: 72
End: 2017-06-22
Payer: MEDICARE

## 2017-06-22 VITALS
RESPIRATION RATE: 20 BRPM | WEIGHT: 142.56 LBS | OXYGEN SATURATION: 98 % | HEART RATE: 68 BPM | BODY MASS INDEX: 27.99 KG/M2 | DIASTOLIC BLOOD PRESSURE: 72 MMHG | SYSTOLIC BLOOD PRESSURE: 110 MMHG | HEIGHT: 60 IN

## 2017-06-22 DIAGNOSIS — G47.61 PERIODIC LIMB MOVEMENT DISORDER (PLMD): ICD-10-CM

## 2017-06-22 DIAGNOSIS — F17.201: ICD-10-CM

## 2017-06-22 DIAGNOSIS — F51.04 PSYCHOPHYSIOLOGICAL INSOMNIA: Primary | ICD-10-CM

## 2017-06-22 DIAGNOSIS — G47.33 OBSTRUCTIVE SLEEP APNEA SYNDROME: ICD-10-CM

## 2017-06-22 PROBLEM — F17.200 TOBACCO USE DISORDER: Status: RESOLVED | Noted: 2017-05-15 | Resolved: 2017-06-22

## 2017-06-22 PROCEDURE — 99213 OFFICE O/P EST LOW 20 MIN: CPT | Mod: S$GLB,,, | Performed by: NURSE PRACTITIONER

## 2017-06-22 PROCEDURE — 1159F MED LIST DOCD IN RCRD: CPT | Mod: S$GLB,,, | Performed by: NURSE PRACTITIONER

## 2017-06-22 PROCEDURE — 99999 PR PBB SHADOW E&M-EST. PATIENT-LVL IV: CPT | Mod: PBBFAC,,, | Performed by: NURSE PRACTITIONER

## 2017-06-22 NOTE — ASSESSMENT & PLAN NOTE
psg 6/3/2017 no sleep apnea detected. AHI 1.8.  She has old equipment from 2005 set on CPAP 8 cm that she will stop using.  Patient will continue to monitor and return for reevaluation  If has daytime sleepiness without CPAP she will return for reevaluation for consideration for repeat study with full night psg.

## 2017-06-22 NOTE — Clinical Note
Hate to bother you with this, but if I was checking to determine if still had sleep apnea, why didn't the tech just do full nights study? There was no sleep apnea up until 1:30am, decided to put on CPAP with AHI 1.8, and AHI was 0.0 on CPAP 4-8.  Decision per patient for no treatment and will return if becomes symptomatic without CPAP in use.   Would Medicare even cover new machine without diagnosis of sleep apnea any? I would not think so.
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
## 2017-06-22 NOTE — ASSESSMENT & PLAN NOTE
Quit on 5/30/2017 has remained in remission with nicotine patch.  States misses cigarettes, but motivated to remain smoke free.

## 2017-06-22 NOTE — ASSESSMENT & PLAN NOTE
Patient reports at this visit not always controlled on ativan 1 mg  Prescribed by her Primary Care Provider, Dr. Womack  Plans to speak to Primary Care Provider about changing  Other stress control measures  Improve sleep hygiene.

## 2017-07-03 RX ORDER — LUBIPROSTONE 24 UG/1
CAPSULE, GELATIN COATED ORAL
Qty: 180 CAPSULE | Refills: 3 | Status: SHIPPED | OUTPATIENT
Start: 2017-07-03 | End: 2018-03-26

## 2017-07-05 ENCOUNTER — TELEPHONE (OUTPATIENT)
Dept: RADIOLOGY | Facility: HOSPITAL | Age: 72
End: 2017-07-05

## 2017-07-06 ENCOUNTER — HOSPITAL ENCOUNTER (OUTPATIENT)
Dept: RADIOLOGY | Facility: HOSPITAL | Age: 72
Discharge: HOME | End: 2017-07-06
Attending: FAMILY MEDICINE
Payer: MEDICARE

## 2017-07-06 DIAGNOSIS — R92.8 ABNORMAL MAMMOGRAM OF LEFT BREAST: ICD-10-CM

## 2017-07-06 DIAGNOSIS — R92.8 ABNORMAL MAMMOGRAM: ICD-10-CM

## 2017-07-06 PROCEDURE — 77061 BREAST TOMOSYNTHESIS UNI: CPT | Mod: TC,LT

## 2017-07-06 PROCEDURE — 76642 ULTRASOUND BREAST LIMITED: CPT | Mod: 26,LT,, | Performed by: RADIOLOGY

## 2017-07-06 PROCEDURE — 77061 BREAST TOMOSYNTHESIS UNI: CPT | Mod: 26,LT,, | Performed by: RADIOLOGY

## 2017-07-06 PROCEDURE — 77065 DX MAMMO INCL CAD UNI: CPT | Mod: 26,LT,, | Performed by: RADIOLOGY

## 2017-07-06 PROCEDURE — 76642 ULTRASOUND BREAST LIMITED: CPT | Mod: TC,PO,LT

## 2017-07-10 ENCOUNTER — TELEPHONE (OUTPATIENT)
Dept: FAMILY MEDICINE | Facility: CLINIC | Age: 72
End: 2017-07-10

## 2017-07-10 DIAGNOSIS — R92.8 FOLLOW-UP EXAMINATION OF ABNORMAL MAMMOGRAM: Primary | ICD-10-CM

## 2017-07-20 ENCOUNTER — TELEPHONE (OUTPATIENT)
Dept: FAMILY MEDICINE | Facility: CLINIC | Age: 72
End: 2017-07-20

## 2017-07-20 ENCOUNTER — OFFICE VISIT (OUTPATIENT)
Dept: FAMILY MEDICINE | Facility: CLINIC | Age: 72
End: 2017-07-20
Payer: MEDICARE

## 2017-07-20 VITALS
HEIGHT: 60 IN | DIASTOLIC BLOOD PRESSURE: 70 MMHG | SYSTOLIC BLOOD PRESSURE: 100 MMHG | TEMPERATURE: 97 F | OXYGEN SATURATION: 97 % | RESPIRATION RATE: 18 BRPM | HEART RATE: 94 BPM | WEIGHT: 144.63 LBS | BODY MASS INDEX: 28.39 KG/M2

## 2017-07-20 DIAGNOSIS — G47.00 INSOMNIA, UNSPECIFIED TYPE: ICD-10-CM

## 2017-07-20 DIAGNOSIS — R21 RASH OF NECK: ICD-10-CM

## 2017-07-20 DIAGNOSIS — H60.501 ACUTE OTITIS EXTERNA OF RIGHT EAR, UNSPECIFIED TYPE: Primary | ICD-10-CM

## 2017-07-20 PROCEDURE — 99999 PR PBB SHADOW E&M-EST. PATIENT-LVL IV: CPT | Mod: PBBFAC,,, | Performed by: FAMILY MEDICINE

## 2017-07-20 PROCEDURE — 99214 OFFICE O/P EST MOD 30 MIN: CPT | Mod: S$GLB,,, | Performed by: FAMILY MEDICINE

## 2017-07-20 PROCEDURE — 1126F AMNT PAIN NOTED NONE PRSNT: CPT | Mod: S$GLB,,, | Performed by: FAMILY MEDICINE

## 2017-07-20 PROCEDURE — 1159F MED LIST DOCD IN RCRD: CPT | Mod: S$GLB,,, | Performed by: FAMILY MEDICINE

## 2017-07-20 RX ORDER — NEOMYCIN SULFATE, POLYMYXIN B SULFATE AND HYDROCORTISONE 10; 3.5; 1 MG/ML; MG/ML; [USP'U]/ML
3 SUSPENSION/ DROPS AURICULAR (OTIC) 4 TIMES DAILY
Qty: 6 ML | Refills: 0 | Status: SHIPPED | OUTPATIENT
Start: 2017-07-20 | End: 2017-07-20 | Stop reason: SDUPTHER

## 2017-07-20 RX ORDER — TRAZODONE HYDROCHLORIDE 50 MG/1
50 TABLET ORAL NIGHTLY
Qty: 30 TABLET | Refills: 3 | Status: SHIPPED | OUTPATIENT
Start: 2017-07-20 | End: 2017-07-25 | Stop reason: ALTCHOICE

## 2017-07-20 RX ORDER — CLOTRIMAZOLE AND BETAMETHASONE DIPROPIONATE 10; .64 MG/G; MG/G
CREAM TOPICAL 2 TIMES DAILY
Qty: 15 G | Refills: 0 | Status: SHIPPED | OUTPATIENT
Start: 2017-07-20 | End: 2017-07-30

## 2017-07-20 RX ORDER — NEOMYCIN SULFATE, POLYMYXIN B SULFATE AND HYDROCORTISONE 10; 3.5; 1 MG/ML; MG/ML; [USP'U]/ML
3 SUSPENSION/ DROPS AURICULAR (OTIC) 4 TIMES DAILY
Qty: 6 ML | Refills: 0 | Status: SHIPPED | OUTPATIENT
Start: 2017-07-20 | End: 2017-07-27

## 2017-07-20 NOTE — TELEPHONE ENCOUNTER
Called in generic.  Expected them to give her generic.  Please let me know if this is expensive because should not be.    *Accidentally sent first rx to [facility] - please cancel

## 2017-07-20 NOTE — PROGRESS NOTES
Subjective:      Patient ID: Yara Pradhan is a 71 y.o. female.    Chief Complaint: Otalgia      Past Medical History:   Diagnosis Date    Anxiety 4/13/2017    Drug-induced constipation 4/13/2017    Hypercholesterolemia 4/13/2017    Hypertension     Moderate episode of recurrent major depressive disorder 4/13/2017    Seasonal allergic rhinitis due to pollen 4/13/2017     Past Surgical History:   Procedure Laterality Date    back surgeries, multiple N/A     HYSTERECTOMY      KATHLEEN    LAMINECTOMY THORACIC SPINE W/ PLACEMENT SPINAL CORD STIMULATOR      Dr. Luu manages     Family History   Problem Relation Age of Onset    Heart attack Mother     Heart attack Father      Social History     Social History    Marital status:      Spouse name: N/A    Number of children: N/A    Years of education: N/A     Occupational History    Not on file.     Social History Main Topics    Smoking status: Former Smoker     Quit date: 5/30/2017    Smokeless tobacco: Never Used    Alcohol use Yes    Drug use: Unknown    Sexual activity: Not on file     Other Topics Concern    Not on file     Social History Narrative    No narrative on file       Current Outpatient Prescriptions:     AMITIZA 24 mcg Cap, TAKE 1 CAPSULE TWICE DAILY WITH MEALS, Disp: 180 capsule, Rfl: 3    ascorbic acid, vitamin C, (VITAMIN C) 500 MG tablet, Take 500 mg by mouth once daily., Disp: , Rfl:     aspirin (ECOTRIN) 81 MG EC tablet, Take 81 mg by mouth once daily., Disp: , Rfl:     atorvastatin (LIPITOR) 40 MG tablet, Take 1 tablet (40 mg total) by mouth once daily., Disp: 90 tablet, Rfl: 3    carvedilol (COREG) 12.5 MG tablet, Take 1 tablet (12.5 mg total) by mouth 2 (two) times daily., Disp: 90 tablet, Rfl: 3    cranberry 400 mg Cap, Take 1 tablet by mouth., Disp: , Rfl:     doxycycline hyclate (DORYX) 200 mg EC tablet, Take 200 mg by mouth 2 (two) times daily., Disp: , Rfl:     fish oil-omega-3 fatty acids 300-1,000 mg  capsule, Take 2 g by mouth once daily., Disp: , Rfl:     Lactobacillus rhamnosus GG (CULTURELLE) 10 billion cell capsule, Take 1 capsule by mouth once daily., Disp: , Rfl:     lorazepam (ATIVAN) 1 MG tablet, Take 1 tablet (1 mg total) by mouth 2 (two) times daily., Disp: 180 tablet, Rfl: 1    montelukast (SINGULAIR) 10 mg tablet, Take 1 tablet (10 mg total) by mouth once daily., Disp: 90 tablet, Rfl: 3    pantoprazole (PROTONIX) 40 MG tablet, Take 1 tablet (40 mg total) by mouth once daily., Disp: 90 tablet, Rfl: 3    tramadol (ULTRAM) 50 mg tablet, , Disp: , Rfl:     valsartan-hydrochlorothiazide (DIOVAN-HCT) 320-25 mg per tablet, Take 1 tablet by mouth once daily., Disp: 90 tablet, Rfl: 3    vitamin D (VITAMIN D3) 1000 units Tab, Take 1,000 Units by mouth once daily., Disp: , Rfl:     clotrimazole-betamethasone 1-0.05% (LOTRISONE) cream, Apply topically 2 (two) times daily., Disp: 15 g, Rfl: 0    neomycin-colist-HC-thonzonium (CORTISPORIN-TC) 3.3-3-10-0.5 mg/mL DrpS, Place 4 drops into the right ear 3 (three) times daily., Disp: 10 mL, Rfl: 0    trazodone (DESYREL) 50 MG tablet, Take 1 tablet (50 mg total) by mouth every evening., Disp: 30 tablet, Rfl: 3    venlafaxine (EFFEXOR-XR) 75 MG 24 hr capsule, Take 3 capsules (225 mg total) by mouth once daily., Disp: 90 capsule, Rfl: 0  Review of patient's allergies indicates:   Allergen Reactions    Lortab [hydrocodone-acetaminophen] Anaphylaxis and Swelling    Codeine Itching and Nausea Only    Percocet [oxycodone-acetaminophen] Nausea Only       Review of Systems   Constitutional: Negative for chills and fever.   HENT: Positive for ear pain. Negative for congestion and nosebleeds.    Respiratory: Negative for shortness of breath and wheezing.    Cardiovascular: Negative for chest pain and palpitations.   Gastrointestinal: Negative for abdominal pain and blood in stool.   Skin: Positive for rash.   Psychiatric/Behavioral: Positive for sleep disturbance.  Negative for decreased concentration and dysphoric mood.     Otalgia    Associated symptoms include a rash. Pertinent negatives include no abdominal pain.     Right ear pain for one week.  Better today.  She used otc numbing agent.   Neck - rash - she has had this rash in the past and cream cleared it.  Last time 2 years ago.  Insomnia - persistent.  Not treated with lorazepam.  Anxiety and depression much better.    Objective:   /70 (BP Location: Right arm, Patient Position: Sitting, BP Method: Manual)   Pulse 94   Temp 97.4 °F (36.3 °C) (Tympanic)   Resp 18   Ht 5' (1.524 m)   Wt 65.6 kg (144 lb 10 oz)   SpO2 97%   BMI 28.24 kg/m²      Physical Exam   Constitutional: She is oriented to person, place, and time. She is cooperative. No distress.   HENT:   Right Ear: Hearing and tympanic membrane normal.   Left Ear: Hearing, tympanic membrane, external ear and ear canal normal.   Mouth/Throat: Uvula is midline, oropharynx is clear and moist and mucous membranes are normal.   Right ear - canal swollen with yellow debris - mild tenderness in canal with speculum   Eyes: Conjunctivae and EOM are normal.   Cardiovascular: Normal rate, regular rhythm and normal heart sounds.    Pulmonary/Chest: Effort normal and breath sounds normal.   Musculoskeletal: She exhibits no edema.   Neurological: She is alert and oriented to person, place, and time. No cranial nerve deficit. Coordination and gait normal.   Skin: Skin is warm, dry and intact. She is not diaphoretic.   Neck - erythematous macules, mild pruritus and scales around neck and in skin folds    Psychiatric: She has a normal mood and affect. Her speech is normal and behavior is normal.   Nursing note and vitals reviewed.          Assessment:       1. Acute otitis externa of right ear, unspecified type    2. Rash of neck    3. Insomnia, unspecified type            Plan:         Acute otitis externa of right ear, unspecified type    Rash of neck    Insomnia,  unspecified type    Other orders  -     neomycin-colist-HC-thonzonium (CORTISPORIN-TC) 3.3-3-10-0.5 mg/mL DrpS; Place 4 drops into the right ear 3 (three) times daily.  Dispense: 10 mL; Refill: 0  -     clotrimazole-betamethasone 1-0.05% (LOTRISONE) cream; Apply topically 2 (two) times daily.  Dispense: 15 g; Refill: 0  -     trazodone (DESYREL) 50 MG tablet; Take 1 tablet (50 mg total) by mouth every evening.  Dispense: 30 tablet; Refill: 3        Patient Care Team:  Naima Womack MD as PCP - General (Family Medicine)  Chad Luu MD as Consulting Physician (Pain Medicine)  Francisco Amaya MD as Consulting Physician (Gastroenterology)  Xavi Ridley IV, MD (Vascular Surgery)  Hiro Ferrera DPM (Podiatry)  Lauro Lopez MD (Ophthalmology)

## 2017-07-20 NOTE — TELEPHONE ENCOUNTER
Pt stated the pharmacy called and informed her that the coresspor Drps that you prescribed to her was not covered by her insurance and wants to know if you can send in a different brand for cortisporin that will be covered

## 2017-07-20 NOTE — TELEPHONE ENCOUNTER
----- Message from Harvinder Prather sent at 7/20/2017  4:28 PM CDT -----  Contact: [facility]  Caller needs to speak with nurse regarding a script that was suppose to be changed. Please give caller a call at     [facility]  [address]  [place] [address]  Phone: [phone] Fax: [phone]

## 2017-07-24 ENCOUNTER — TELEPHONE (OUTPATIENT)
Dept: FAMILY MEDICINE | Facility: CLINIC | Age: 72
End: 2017-07-24

## 2017-07-24 NOTE — TELEPHONE ENCOUNTER
----- Message from Teresita Anne sent at 7/24/2017  3:07 PM CDT -----  Contact: pt  Pt is calling nurse staff regarding questions about patient medication. Pt call back [phone] to be advised. thanks

## 2017-07-24 NOTE — TELEPHONE ENCOUNTER
Pt states the medication you gave her for sleep does not work and would like something else because she doesn't fall asleep until hours later. She would also like something called in for chest congestion. She is having a productive cough.

## 2017-07-25 RX ORDER — ZOLPIDEM TARTRATE 5 MG/1
5 TABLET ORAL NIGHTLY PRN
Qty: 30 TABLET | Refills: 2 | Status: SHIPPED | OUTPATIENT
Start: 2017-07-25 | End: 2017-07-26 | Stop reason: SDUPTHER

## 2017-07-25 RX ORDER — BENZONATATE 100 MG/1
100 CAPSULE ORAL 3 TIMES DAILY PRN
Qty: 30 CAPSULE | Refills: 0 | Status: SHIPPED | OUTPATIENT
Start: 2017-07-25 | End: 2017-07-26 | Stop reason: SDUPTHER

## 2017-07-25 NOTE — TELEPHONE ENCOUNTER
I can prescribe ambien (aka zolpidem) - this can cause sleep walking, nightmares as occasional side effects - Can not take this with alcohol - No driving.  For chest congestion - everything is over the counter - I can call in a cough suppressant - tessalon perles.

## 2017-07-25 NOTE — TELEPHONE ENCOUNTER
Spoke to patient and she states that she would like to try the Ambien for sleep. Explained to her the side effects and she voiced her understanding. She would also like something for a cough called in also.

## 2017-07-26 ENCOUNTER — TELEPHONE (OUTPATIENT)
Dept: FAMILY MEDICINE | Facility: CLINIC | Age: 72
End: 2017-07-26

## 2017-07-26 RX ORDER — ZOLPIDEM TARTRATE 5 MG/1
5 TABLET ORAL NIGHTLY PRN
Qty: 30 TABLET | Refills: 2 | Status: SHIPPED | OUTPATIENT
Start: 2017-07-26 | End: 2017-11-20 | Stop reason: SDUPTHER

## 2017-07-26 RX ORDER — BENZONATATE 100 MG/1
100 CAPSULE ORAL 3 TIMES DAILY PRN
Qty: 30 CAPSULE | Refills: 0 | Status: SHIPPED | OUTPATIENT
Start: 2017-07-26 | End: 2017-08-05

## 2017-07-26 NOTE — TELEPHONE ENCOUNTER
----- Message from Kenna Paulo sent at 7/25/2017  3:43 PM CDT -----  Patient states she spoke with the nurse this morning and was advised that 2 Rx was being called in. Patient states she is at her pharmacy and they are advising they have not received anything for her.     Pt use..    [facility]  [address]  [place] [address]  Phone: [phone] Fax: [phone]    Please adv/call [phone].//thanks. cw

## 2017-07-26 NOTE — TELEPHONE ENCOUNTER
----- Message from Harleen Winston sent at 7/25/2017  4:03 PM CDT -----  Contact: [facility]  Calling about her RX order. Patient is @ pharmacy  Waiting. Please call [facility] today @ [phone]. Thanks, pedro

## 2017-07-26 NOTE — TELEPHONE ENCOUNTER
Medication was sent to [facility] she is requesting that it be sent to [facility]. Would you please send a supply for the 2 day prescription to [facility].

## 2017-07-26 NOTE — TELEPHONE ENCOUNTER
Medication canceled with [employer] and patient notified that it was sent to her local pharmacy per her request.

## 2017-09-06 DIAGNOSIS — F41.9 ANXIETY: Chronic | ICD-10-CM

## 2017-09-06 RX ORDER — LORAZEPAM 1 MG/1
TABLET ORAL
Qty: 180 TABLET | Refills: 1 | Status: SHIPPED | OUTPATIENT
Start: 2017-09-06 | End: 2018-01-02 | Stop reason: SDUPTHER

## 2017-09-08 DIAGNOSIS — I10 ESSENTIAL HYPERTENSION: Chronic | ICD-10-CM

## 2017-09-08 RX ORDER — CARVEDILOL 12.5 MG/1
TABLET ORAL
Qty: 180 TABLET | Refills: 3 | Status: SHIPPED | OUTPATIENT
Start: 2017-09-08 | End: 2018-03-26 | Stop reason: SDUPTHER

## 2017-09-11 ENCOUNTER — PATIENT OUTREACH (OUTPATIENT)
Dept: FAMILY MEDICINE | Facility: CLINIC | Age: 72
End: 2017-09-11

## 2017-09-12 ENCOUNTER — TELEPHONE (OUTPATIENT)
Dept: FAMILY MEDICINE | Facility: CLINIC | Age: 72
End: 2017-09-12

## 2017-09-12 NOTE — TELEPHONE ENCOUNTER
Dr. Womack, the patient is calling asking for a prescription for Robaxin, I informed her that you had never wrote this prescription for her and you may need to see her for an appointment, she is scheudled for appointment is a couple of weeks, please advise Thanks

## 2017-09-12 NOTE — TELEPHONE ENCOUNTER
----- Message from Munir Ro LPN sent at 9/12/2017  4:21 PM CDT -----  Contact: pt       ----- Message -----  From: Tootie Salinas  Sent: 9/12/2017   4:13 PM  To: [facility] Arevalo Staff    1. What is the name of the medication you are requesting? lorazepam  2. What is the dose? 1 mg   3. How do you take the medication? Orally, topically, etc?   4. How often do you take this medication? 2 a day   5. Do you need a 30 day or 90 day supply? 90  6. How many refills are you requesting? 1  7. What is your preferred pharmacy and location of the pharmacy?   [facility] - [place], [place] - [address]  [address] [address]  Phone: [phone] Fax: [phone]      8. Who can we contact with further questions? the patient @  [phone] (home)

## 2017-09-12 NOTE — TELEPHONE ENCOUNTER
----- Message from Clementine Cheli sent at 9/12/2017  3:49 PM CDT -----  Contact: self [phone]  States that she would like to speak to nurse regarding getting a rx for robaxin. Pt uses     [facility]  [address] [place] [address]  Phone: [phone] Fax: [phone]    Please call back at [phone]//thank you acc

## 2017-09-12 NOTE — TELEPHONE ENCOUNTER
I have 2 refill requests on patient - not sure if this is correct- lorazepam and robaxin.  Robaxin is a new medication I have not rx'd - will need to discuss.  Please let her know.  I can call in lorazepam I this is needed.

## 2017-09-25 ENCOUNTER — LAB VISIT (OUTPATIENT)
Dept: LAB | Facility: HOSPITAL | Age: 72
End: 2017-09-25
Payer: MEDICARE

## 2017-09-25 ENCOUNTER — OFFICE VISIT (OUTPATIENT)
Dept: FAMILY MEDICINE | Facility: CLINIC | Age: 72
End: 2017-09-25
Payer: MEDICARE

## 2017-09-25 VITALS
HEART RATE: 65 BPM | HEIGHT: 60 IN | TEMPERATURE: 96 F | WEIGHT: 144.38 LBS | OXYGEN SATURATION: 98 % | BODY MASS INDEX: 28.35 KG/M2 | SYSTOLIC BLOOD PRESSURE: 126 MMHG | RESPIRATION RATE: 17 BRPM | DIASTOLIC BLOOD PRESSURE: 84 MMHG

## 2017-09-25 DIAGNOSIS — F51.04 PSYCHOPHYSIOLOGICAL INSOMNIA: ICD-10-CM

## 2017-09-25 DIAGNOSIS — I10 ESSENTIAL HYPERTENSION: ICD-10-CM

## 2017-09-25 DIAGNOSIS — R73.03 PREDIABETES: Primary | ICD-10-CM

## 2017-09-25 DIAGNOSIS — R73.03 PREDIABETES: ICD-10-CM

## 2017-09-25 DIAGNOSIS — F33.1 MODERATE EPISODE OF RECURRENT MAJOR DEPRESSIVE DISORDER: ICD-10-CM

## 2017-09-25 LAB
ALBUMIN SERPL BCP-MCNC: 3.4 G/DL
ALP SERPL-CCNC: 116 U/L
ALT SERPL W/O P-5'-P-CCNC: 16 U/L
ANION GAP SERPL CALC-SCNC: 8 MMOL/L
AST SERPL-CCNC: 22 U/L
BILIRUB SERPL-MCNC: 0.7 MG/DL
BUN SERPL-MCNC: 17 MG/DL
CALCIUM SERPL-MCNC: 9.8 MG/DL
CHLORIDE SERPL-SCNC: 106 MMOL/L
CO2 SERPL-SCNC: 28 MMOL/L
CREAT SERPL-MCNC: 0.9 MG/DL
EST. GFR  (AFRICAN AMERICAN): >60 ML/MIN/1.73 M^2
EST. GFR  (NON AFRICAN AMERICAN): >60 ML/MIN/1.73 M^2
ESTIMATED AVG GLUCOSE: 114 MG/DL
GLUCOSE SERPL-MCNC: 85 MG/DL
HBA1C MFR BLD HPLC: 5.6 %
POTASSIUM SERPL-SCNC: 4.4 MMOL/L
PROT SERPL-MCNC: 7.2 G/DL
SODIUM SERPL-SCNC: 142 MMOL/L

## 2017-09-25 PROCEDURE — 3074F SYST BP LT 130 MM HG: CPT | Mod: S$GLB,,, | Performed by: FAMILY MEDICINE

## 2017-09-25 PROCEDURE — 1159F MED LIST DOCD IN RCRD: CPT | Mod: S$GLB,,, | Performed by: FAMILY MEDICINE

## 2017-09-25 PROCEDURE — 3079F DIAST BP 80-89 MM HG: CPT | Mod: S$GLB,,, | Performed by: FAMILY MEDICINE

## 2017-09-25 PROCEDURE — 83036 HEMOGLOBIN GLYCOSYLATED A1C: CPT

## 2017-09-25 PROCEDURE — 3008F BODY MASS INDEX DOCD: CPT | Mod: S$GLB,,, | Performed by: FAMILY MEDICINE

## 2017-09-25 PROCEDURE — 80053 COMPREHEN METABOLIC PANEL: CPT

## 2017-09-25 PROCEDURE — 1125F AMNT PAIN NOTED PAIN PRSNT: CPT | Mod: S$GLB,,, | Performed by: FAMILY MEDICINE

## 2017-09-25 PROCEDURE — 99999 PR PBB SHADOW E&M-EST. PATIENT-LVL III: CPT | Mod: PBBFAC,,, | Performed by: FAMILY MEDICINE

## 2017-09-25 PROCEDURE — 36415 COLL VENOUS BLD VENIPUNCTURE: CPT | Mod: PO

## 2017-09-25 PROCEDURE — 99214 OFFICE O/P EST MOD 30 MIN: CPT | Mod: S$GLB,,, | Performed by: FAMILY MEDICINE

## 2017-09-25 NOTE — PATIENT INSTRUCTIONS
4 Steps for Eating Healthier  Changing the way you eat can improve your health. It can lower your cholesterol and blood pressure, and help you stay at a healthy weight. Your diet doesnt have to be bland and boring to be healthy. Just watch your calories and follow these steps:    1. Eat fewer unhealthy fats  · Choose more fish and lean meats instead of fatty cuts of meat.  · Skip butter and lard, and use less margarine.  · Pass on foods that have palm, coconut, or hydrogenated oils.  · Eat fewer high-fat dairy foods like cheese, ice cream, and whole milk.  · Get a heart-healthy cookbook and try some low-fat recipes.  2. Go light on salt  · Keep the saltshaker off the table.  · Limit high-salt ingredients, such as soy sauce, bouillon, and garlic salt.  · Instead of adding salt when cooking, season your food with herbs and flavorings. Try lemon, garlic, and onion.  · Limit convenience foods, such as boxed or canned foods and restaurant food.  · Read food labels and choose lower-sodium options.  3. Limit sugar  · Pause before you add sugars to pancakes, cereal, coffee, or tea. This includes white and brown table sugar, syrup, honey, and molasses. Cut your usual amount by half.  · Use non-sugar sweeteners. Stevia, aspartame, and sucralose can satisfy a sweet tooth without adding calories.  · Swap out sugar-filled soda and other drinks. Buy sugar-free or low-calorie beverages. Remember water is always the best choice.  · Read labels and choose foods with less added sugar. Keep in mind that dairy foods and foods with fruit will have some natural sugar.  · Cut the sugar in recipes by 1/3 to 1/2. Boost the flavor with extracts like almond, vanilla, or orange. Or add spices such as cinnamon or nutmeg.  4. Eat more fiber  · Eat fresh fruits and vegetables every day.  · Boost your diet with whole grains. Go for oats, whole-grain rice, and bran.  · Add beans and lentils to your meals.  · Drink more water to match your fiber  increase. This is to help prevent constipation.  Date Last Reviewed: 5/11/2015  © 7693-0326 [employer] [address] [place] [address], [place] [address]. All rights reserved. This information is not intended as a substitute for professional medical care. Always follow your healthcare professional's instructions.

## 2017-09-25 NOTE — PROGRESS NOTES
Subjective:      Patient ID: Yara Pradhan is a 71 y.o. female.    Chief Complaint: Follow-up      Past Medical History:   Diagnosis Date    Anxiety 4/13/2017    Drug-induced constipation 4/13/2017    Hypercholesterolemia 4/13/2017    Hypertension     Moderate episode of recurrent major depressive disorder 4/13/2017    Seasonal allergic rhinitis due to pollen 4/13/2017     Past Surgical History:   Procedure Laterality Date    back surgeries, multiple N/A     HYSTERECTOMY      KATHLEEN    LAMINECTOMY THORACIC SPINE W/ PLACEMENT SPINAL CORD STIMULATOR      Dr. Luu manages     Family History   Problem Relation Age of Onset    Heart attack Mother     Heart attack Father      Social History     Social History    Marital status:      Spouse name: N/A    Number of children: N/A    Years of education: N/A     Occupational History    Not on file.     Social History Main Topics    Smoking status: Former Smoker     Quit date: 5/30/2017    Smokeless tobacco: Never Used    Alcohol use Yes    Drug use: Unknown    Sexual activity: Not on file     Other Topics Concern    Not on file     Social History Narrative    No narrative on file       Current Outpatient Prescriptions:     AMITIZA 24 mcg Cap, TAKE 1 CAPSULE TWICE DAILY WITH MEALS, Disp: 180 capsule, Rfl: 3    ascorbic acid, vitamin C, (VITAMIN C) 500 MG tablet, Take 500 mg by mouth once daily., Disp: , Rfl:     aspirin (ECOTRIN) 81 MG EC tablet, Take 81 mg by mouth once daily., Disp: , Rfl:     atorvastatin (LIPITOR) 40 MG tablet, Take 1 tablet (40 mg total) by mouth once daily., Disp: 90 tablet, Rfl: 3    carvedilol (COREG) 12.5 MG tablet, TAKE 1 TABLET TWICE DAILY, Disp: 180 tablet, Rfl: 3    cranberry 400 mg Cap, Take 1 tablet by mouth., Disp: , Rfl:     doxycycline hyclate (DORYX) 200 mg EC tablet, Take 200 mg by mouth 2 (two) times daily., Disp: , Rfl:     fish oil-omega-3 fatty acids 300-1,000 mg capsule, Take 2 g by mouth once  daily., Disp: , Rfl:     Lactobacillus rhamnosus GG (CULTURELLE) 10 billion cell capsule, Take 1 capsule by mouth once daily., Disp: , Rfl:     lorazepam (ATIVAN) 1 MG tablet, TAKE 1 TABLET TWICE DAILY, Disp: 180 tablet, Rfl: 1    montelukast (SINGULAIR) 10 mg tablet, Take 1 tablet (10 mg total) by mouth once daily., Disp: 90 tablet, Rfl: 3    pantoprazole (PROTONIX) 40 MG tablet, Take 1 tablet (40 mg total) by mouth once daily., Disp: 90 tablet, Rfl: 3    tramadol (ULTRAM) 50 mg tablet, , Disp: , Rfl:     valsartan-hydrochlorothiazide (DIOVAN-HCT) 320-25 mg per tablet, Take 1 tablet by mouth once daily., Disp: 90 tablet, Rfl: 3    vitamin D (VITAMIN D3) 1000 units Tab, Take 1,000 Units by mouth once daily., Disp: , Rfl:     zolpidem (AMBIEN) 5 MG Tab, Take 1 tablet (5 mg total) by mouth nightly as needed., Disp: 30 tablet, Rfl: 2    venlafaxine (EFFEXOR-XR) 75 MG 24 hr capsule, Take 3 capsules (225 mg total) by mouth once daily., Disp: 90 capsule, Rfl: 0  Review of patient's allergies indicates:   Allergen Reactions    Lortab [hydrocodone-acetaminophen] Anaphylaxis and Swelling    Codeine Itching and Nausea Only    Percocet [oxycodone-acetaminophen] Nausea Only       Review of Systems   Constitutional: Negative for activity change and unexpected weight change.   HENT: Negative for hearing loss, rhinorrhea and trouble swallowing.    Eyes: Negative for discharge and visual disturbance.   Respiratory: Negative for chest tightness and wheezing.    Cardiovascular: Negative for chest pain and palpitations.   Gastrointestinal: Negative for blood in stool, diarrhea and vomiting.   Endocrine: Negative for polydipsia and polyuria.   Genitourinary: Negative for difficulty urinating, dysuria, hematuria and menstrual problem.   Musculoskeletal: Positive for arthralgias and joint swelling. Negative for neck pain.   Neurological: Negative for weakness and headaches.   Psychiatric/Behavioral: Negative for confusion and  dysphoric mood.     HPI    Patient here today to f/u on prediabetes.  Last hgba1c 6.0%. Due for recheck.  She also is following up on insomnia - ambien workingwell.  Depression stable./controlled.  She denies any new concerns today.  BP's well controlled.  Objective:   /84 (BP Location: Left arm, Patient Position: Sitting, BP Method: Medium (Manual))   Pulse 65   Temp 96 °F (35.6 °C) (Tympanic)   Resp 17   Ht 5' (1.524 m)   Wt 65.5 kg (144 lb 6.4 oz)   SpO2 98%   BMI 28.20 kg/m²      Physical Exam   Constitutional: She is oriented to person, place, and time. She is cooperative. No distress.   Eyes: Conjunctivae and EOM are normal.   Cardiovascular: Normal rate, regular rhythm and normal heart sounds.    Pulmonary/Chest: Effort normal and breath sounds normal.   Musculoskeletal: She exhibits no edema.   Neurological: She is alert and oriented to person, place, and time. Coordination and gait normal.   Skin: Skin is warm, dry and intact. No rash noted. She is not diaphoretic. No erythema.   Psychiatric: She has a normal mood and affect. Her speech is normal and behavior is normal.   Nursing note and vitals reviewed.          Assessment:       1. Prediabetes    2. Essential hypertension    3. Moderate episode of recurrent major depressive disorder    4. Psychophysiological insomnia            Plan:         Prediabetes  Comments:  Hgba1c 6.0.  Encouraged healthy diet and exercise.  Orders:  -     Comprehensive metabolic panel; Future; Expected date: 09/25/2017  -     Hemoglobin A1c; Future; Expected date: 09/25/2017    Essential hypertension  Comments:  Well controlled.    Moderate episode of recurrent major depressive disorder  Comments:  WEll controlled.    Psychophysiological insomnia  Comments:  Well controlled on ambien.  Feeling much better.  Denies any side effects.        Patient Care Team:  Naima Womack MD as PCP - General (Family Medicine)  Chad Luu MD as Consulting Physician (Pain  Medicine)  Francisco Amaya MD as Consulting Physician (Gastroenterology)  Xavi Ridley IV, MD (Vascular Surgery)  Hiro Ferrera DPM (Podiatry)  Lauro Lopez MD (Ophthalmology)    Return in about 6 months (around 3/25/2018) for review of bp/meds, review labs.

## 2017-09-27 ENCOUNTER — TELEPHONE (OUTPATIENT)
Dept: FAMILY MEDICINE | Facility: CLINIC | Age: 72
End: 2017-09-27

## 2017-10-19 ENCOUNTER — TELEPHONE (OUTPATIENT)
Dept: FAMILY MEDICINE | Facility: CLINIC | Age: 72
End: 2017-10-19

## 2017-10-19 DIAGNOSIS — R73.03 PREDIABETES: Primary | ICD-10-CM

## 2017-10-19 NOTE — TELEPHONE ENCOUNTER
----- Message from Brandon Boone sent at 10/19/2017 10:48 AM CDT -----  Contact: pt   States she's calling to have the orders placed back in system for pt's lab/ wanting to resched the labs and can be reached at [phone]//thanks/dbw     Wants to come in the same day but later in that day

## 2017-10-26 PROBLEM — Z11.59 NEED FOR HEPATITIS C SCREENING TEST: Status: RESOLVED | Noted: 2017-05-25 | Resolved: 2017-10-26

## 2017-10-26 PROBLEM — K05.10 GINGIVITIS: Status: RESOLVED | Noted: 2017-05-25 | Resolved: 2017-10-26

## 2017-10-26 PROBLEM — Z12.31 OTHER SCREENING MAMMOGRAM: Status: RESOLVED | Noted: 2017-05-25 | Resolved: 2017-10-26

## 2017-11-20 RX ORDER — ZOLPIDEM TARTRATE 5 MG/1
5 TABLET ORAL NIGHTLY PRN
Qty: 30 TABLET | Refills: 2 | Status: SHIPPED | OUTPATIENT
Start: 2017-11-20 | End: 2018-05-08 | Stop reason: ALTCHOICE

## 2017-11-28 ENCOUNTER — LAB VISIT (OUTPATIENT)
Dept: LAB | Facility: HOSPITAL | Age: 72
End: 2017-11-28
Attending: FAMILY MEDICINE
Payer: MEDICARE

## 2017-11-28 ENCOUNTER — OFFICE VISIT (OUTPATIENT)
Dept: FAMILY MEDICINE | Facility: CLINIC | Age: 72
End: 2017-11-28
Payer: MEDICARE

## 2017-11-28 VITALS
HEIGHT: 60 IN | TEMPERATURE: 98 F | HEART RATE: 72 BPM | DIASTOLIC BLOOD PRESSURE: 74 MMHG | OXYGEN SATURATION: 97 % | WEIGHT: 146.63 LBS | RESPIRATION RATE: 17 BRPM | BODY MASS INDEX: 28.79 KG/M2 | SYSTOLIC BLOOD PRESSURE: 124 MMHG

## 2017-11-28 DIAGNOSIS — K59.09 CHRONIC CONSTIPATION: ICD-10-CM

## 2017-11-28 DIAGNOSIS — F33.1 MODERATE EPISODE OF RECURRENT MAJOR DEPRESSIVE DISORDER: Chronic | ICD-10-CM

## 2017-11-28 DIAGNOSIS — I10 ESSENTIAL HYPERTENSION: Chronic | ICD-10-CM

## 2017-11-28 DIAGNOSIS — F51.04 PSYCHOPHYSIOLOGICAL INSOMNIA: ICD-10-CM

## 2017-11-28 DIAGNOSIS — M54.9 CHRONIC BACK PAIN, UNSPECIFIED BACK LOCATION, UNSPECIFIED BACK PAIN LATERALITY: ICD-10-CM

## 2017-11-28 DIAGNOSIS — E78.00 HYPERCHOLESTEROLEMIA: Chronic | ICD-10-CM

## 2017-11-28 DIAGNOSIS — G47.61 PERIODIC LIMB MOVEMENT DISORDER (PLMD): ICD-10-CM

## 2017-11-28 DIAGNOSIS — G47.33 OBSTRUCTIVE SLEEP APNEA SYNDROME: ICD-10-CM

## 2017-11-28 DIAGNOSIS — F41.9 ANXIETY: Chronic | ICD-10-CM

## 2017-11-28 DIAGNOSIS — G89.29 CHRONIC BACK PAIN, UNSPECIFIED BACK LOCATION, UNSPECIFIED BACK PAIN LATERALITY: ICD-10-CM

## 2017-11-28 DIAGNOSIS — R73.03 PREDIABETES: ICD-10-CM

## 2017-11-28 DIAGNOSIS — I73.9 PERIPHERAL VASCULAR DISEASE: ICD-10-CM

## 2017-11-28 DIAGNOSIS — Z00.00 ENCOUNTER FOR PREVENTIVE HEALTH EXAMINATION: Primary | ICD-10-CM

## 2017-11-28 DIAGNOSIS — I77.9 BILATERAL CAROTID ARTERY DISEASE: ICD-10-CM

## 2017-11-28 PROBLEM — I65.23 BILATERAL CAROTID ARTERY STENOSIS: Status: RESOLVED | Noted: 2017-06-02 | Resolved: 2017-11-28

## 2017-11-28 PROBLEM — F17.201: Status: RESOLVED | Noted: 2017-06-22 | Resolved: 2017-11-28

## 2017-11-28 PROBLEM — K59.03 DRUG-INDUCED CONSTIPATION: Chronic | Status: RESOLVED | Noted: 2017-04-13 | Resolved: 2017-11-28

## 2017-11-28 LAB
ALBUMIN SERPL BCP-MCNC: 3.1 G/DL
ALP SERPL-CCNC: 99 U/L
ALT SERPL W/O P-5'-P-CCNC: 14 U/L
ANION GAP SERPL CALC-SCNC: 7 MMOL/L
AST SERPL-CCNC: 21 U/L
BILIRUB SERPL-MCNC: 0.5 MG/DL
BUN SERPL-MCNC: 17 MG/DL
CALCIUM SERPL-MCNC: 9.4 MG/DL
CHLORIDE SERPL-SCNC: 105 MMOL/L
CO2 SERPL-SCNC: 29 MMOL/L
CREAT SERPL-MCNC: 1 MG/DL
EST. GFR  (AFRICAN AMERICAN): >60 ML/MIN/1.73 M^2
EST. GFR  (NON AFRICAN AMERICAN): 57.6 ML/MIN/1.73 M^2
ESTIMATED AVG GLUCOSE: 111 MG/DL
GLUCOSE SERPL-MCNC: 132 MG/DL
HBA1C MFR BLD HPLC: 5.5 %
POTASSIUM SERPL-SCNC: 4.1 MMOL/L
PROT SERPL-MCNC: 6.7 G/DL
SODIUM SERPL-SCNC: 141 MMOL/L

## 2017-11-28 PROCEDURE — G0439 PPPS, SUBSEQ VISIT: HCPCS | Mod: S$GLB,,, | Performed by: REGISTERED NURSE

## 2017-11-28 PROCEDURE — 36415 COLL VENOUS BLD VENIPUNCTURE: CPT | Mod: PO

## 2017-11-28 PROCEDURE — 83036 HEMOGLOBIN GLYCOSYLATED A1C: CPT

## 2017-11-28 PROCEDURE — 99999 PR PBB SHADOW E&M-EST. PATIENT-LVL V: CPT | Mod: PBBFAC,,, | Performed by: REGISTERED NURSE

## 2017-11-28 PROCEDURE — 80053 COMPREHEN METABOLIC PANEL: CPT

## 2017-11-28 NOTE — PROGRESS NOTES
Yara Pradhan presented for a  Medicare AWV and comprehensive Health Risk Assessment today. The following components were reviewed and updated:    · Medical history  · Family History  · Social history  · Allergies and Current Medications  · Health Risk Assessment  · Health Maintenance  · Care Team     See Completed Assessments for Annual Wellness Visit within the encounter summary.       The following assessments were completed:  · Living Situation  · CAGE  · Depression Screening  · Timed Get Up and Go  · Whisper Test  · Cognitive Function Screening  · Nutrition Screening  · ADL Screening  · PAQ Screening    Vitals:    11/28/17 1344   BP: 124/74   BP Location: Left arm   Patient Position: Sitting   BP Method: Medium (Manual)   Pulse: 72   Resp: 17   Temp: 98.3 °F (36.8 °C)   TempSrc: Tympanic   SpO2: 97%   Weight: 66.5 kg (146 lb 9.7 oz)   Height: 5' (1.524 m)     Body mass index is 28.63 kg/m².       Physical Exam   Constitutional: She is oriented to person, place, and time. She appears well-developed and well-nourished. No distress.   HENT:   Head: Normocephalic and atraumatic.   Eyes: Pupils are equal, round, and reactive to light.   Cardiovascular: Normal rate, regular rhythm and normal heart sounds.  Exam reveals no gallop and no friction rub.    No murmur heard.  Pulmonary/Chest: Effort normal and breath sounds normal. No respiratory distress. She has no wheezes. She exhibits no tenderness.   Musculoskeletal: Normal range of motion.   Neurological: She is alert and oriented to person, place, and time.   Skin: No rash noted. She is not diaphoretic.   Psychiatric: She has a normal mood and affect. Her behavior is normal. Judgment and thought content normal.   Vitals reviewed.        Diagnoses and health risks identified today and associated recommendations/orders:    1. Encounter for preventive health examination  HRA completed today.      2. Essential hypertension  Stable and controlled, on medication.   Followed by PCP//Dr. Womack.      3. Hypercholesterolemia  Stable and controlled, on medication.  Followed by PCP.      Component      Latest Ref Rng & Units 5/26/2017   Triglycerides      mg/dL 145   Total Cholesterol/HDL Ratio       154   HDL      mg/dL 76   LDL Cholesterol      mg/dL 49   CHOL/HDLC Ratio       2.0   Non HDL Chol. (LDL+VLDL)       78       4. Prediabetes  Stable, improved levels.  Treating with diet, exercise and healthy lifestyle changes.  Followed by PCP.      Component      Latest Ref Rng & Units 9/25/2017 5/26/2017   Hemoglobin A1C      4.0 - 5.6 % 5.6 6.0   Estimated Avg Glucose      68 - 131 mg/dL 114        5. Peripheral vascular disease  Stable with intermittent issues.  Atherosclerosis with claudication diagnosed by Dr. Ridley 6/2016 with abnormal LATASHA study, report not on file.      6. Bilateral carotid artery disease  Stable, asymptomatic.  No report on file.  Does have 20 to 39% stenosis bilaterally.  Followed by Dr. Ridley.      7. Periodic limb movement disorder (PLMD)  Stable, has intermittent issues.  She does take OTC medication for RLS symptoms with complete relief.  Followed by Pulmonary.       8. Obstructive sleep apnea syndrome  Stable, no problems reported.  Diagnosed in 2005, no report available.  Most recent PSG dated 6/3/2017 negative for ARMIDA.  Followed by Pulmonary.      9. Psychophysiological insomnia  Stable and controlled, on Ambien as ordered.  Followed by PCP.      10. Anxiety  Stable and controlled, on Ativan twice daily as ordered.  Followed by PCP.      11. Moderate episode of recurrent major depressive disorder  Stable and controlled, on Effexor as ordered.  Followed by PCP.      12. Chronic constipation  Stable and controlled, on Amitiza as ordered.  This started after multiple back surgeries, still with rectal numbness.  Followed by Dr. Amaya.      13. Chronic back pain, unspecified back location, unspecified back pain laterality  Stable, chronic issue.   She is currently under care of Dr. Luu.  Has spinal cord stimulator in place, takes Ultram daily for pain.        Provided Mrs. Pradhan with a 5-10 year written screening schedule and personal prevention plan. Recommendations were developed using the USPSTF age appropriate recommendations. Education, counseling, and referrals were provided as needed. After Visit Summary printed and given to patient which includes a list of additional screenings\tests needed.        Scheduled for follow-up appointment with Dr. Womack on 3/26/2018.        Patient Care Team:  Naima Womack MD as PCP - General (Family Medicine)  Chad Luu MD as Consulting Physician (Pain Medicine)  Francisco Amaya MD as Consulting Physician (Gastroenterology)  Xavi Ridley IV, MD (Vascular Surgery)  Lauro Lopez MD (Ophthalmology)          Jose Manuel, KATHY

## 2017-11-28 NOTE — PATIENT INSTRUCTIONS
Counseling and Referral of Other Preventative  (Italic type indicates deductible and co-insurance are waived)    Patient Name: Yara Pradhan  Today's Date: 11/28/2017      SERVICE LIMITATIONS RECOMMENDATION    Vaccines    · Pneumococcal (once after 65)    · Influenza (annually)    · Hepatitis B (if medium/high risk)    · Prevnar 13      Hepatitis B medium/high risk factors:       - End-stage renal disease       - Hemophiliacs who received Factor VII or         IX concentrates       - Clients of institutions for the mentally             retarded       - Persons who live in the same house as          a HepB carrier       - Homosexual men       - Illicit injectable drug abusers     Pneumococcal: Done     Influenza: Done     Hepatitis B: n/a     Prevnar 13: Done    Mammogram (biennial age 50-74)  Annually (age 40 or over)  Scheduled 12/11/2017    Pap (up to age 70 and after 70 if unknown history or abnormal study last 10 years)    n/a -- hysterectomy         Colorectal cancer screening (to age 75)    · Fecal occult blood test (annual)  · Flexible sigmoidoscopy (5y)  · Screening colonoscopy (10y)  · Barium enema   2016, due 3 years    Diabetes self-management training (no USPSTF recommendations)  Requires referral by treating physician for patient with diabetes or renal disease. 10 hours of initial DSMT sessions of no less than 30 minutes each in a continuous 12-month period. 2 hours of follow-up DSMT in subsequent years.  n/a    Bone mass measurements (age 65 & older, biennial)  Requires diagnosis related to osteoporosis or estrogen deficiency. Biennial benefit unless patient has history of long-term glucocorticoid  6/16/2017, due 3 years    Glaucoma screening (no USPSTF recommendation)  Diabetes mellitus, family history   , age 50 or over    American, age 65 or over  n/a    Medical nutrition therapy for diabetes or renal disease (no recommended schedule)  Requires referral by treating  physician for patient with diabetes or renal disease or kidney transplant within the past 3 years.  Can be provided in same year as diabetes self-management training (DSMT), and CMS recommends medical nutrition therapy take place after DSMT. Up to 3 hours for initial year and 2 hours in subsequent years.  n/a    Cardiovascular screening blood tests (every 5 years)  · Fasting lipid panel  Order as a panel if possible  5/26/2017    Diabetes screening tests (at least every 3 years, Medicare covers annually or at 6-month intervals for prediabetic patients)  · Fasting blood sugar (FBS) or glucose tolerance test (GTT)  Patient must be diagnosed with one of the following:       - Hypertension       - Dyslipidemia       - Obesity (BMI 30kg/m2)       - Previous elevated impaired FBS or GTT       ... or any two of the following:       - Overweight (BMI 25 but <30)       - Family history of diabetes       - Age 65 or older       - History of gestational diabetes or birth of baby weighing more than 9 pounds  9/25/2017    HIV screening (annually for increased risk patients)  · HIV-1 and HIV-2 by EIA, or DEEPIKA, rapid antibody test or oral mucosa transudate  Patients must be at increased risk for HIV infection per USPSTF guidelines or pregnant. Tests covered annually for patient at increased risk or as requested by the patient. Pregnant patients may receive up to 3 tests during pregnancy.  n/a    Smoking cessation counseling (up to 8 sessions per year)  Patients must be asymptomatic of tobacco-related conditions to receive as a preventative service.  Quit 5/2017    Subsequent annual wellness visit  At least 12 months since last AWV  Scheduled for f/u appt with Dr. Womack on 3/26/2018       The following information is provided to all patients.  This information is to help you find resources for any of the problems found today that may be affecting your health:                Living healthy guide: www.[facility].[place].gov       Understanding Diabetes: www.diabetes.org      Eating healthy: www.cdc.gov/healthyweight      CDC home safety checklist: www.cdc.gov/steadi/patient.html      Agency on Aging: www.goea.[place].[facility]      Alcoholics anonymous (AA): www.aa.org      Physical Activity: www.nahid.nih.gov/ig1hkkv      Tobacco use: www.quitwithusla.org

## 2017-12-11 ENCOUNTER — TELEPHONE (OUTPATIENT)
Dept: FAMILY MEDICINE | Facility: CLINIC | Age: 72
End: 2017-12-11

## 2017-12-11 ENCOUNTER — HOSPITAL ENCOUNTER (OUTPATIENT)
Dept: RADIOLOGY | Facility: HOSPITAL | Age: 72
Discharge: HOME | End: 2017-12-11
Attending: FAMILY MEDICINE
Payer: MEDICARE

## 2017-12-11 VITALS — BODY MASS INDEX: 28.66 KG/M2 | HEIGHT: 60 IN | WEIGHT: 146 LBS

## 2017-12-11 DIAGNOSIS — R92.8 FOLLOW-UP EXAMINATION OF ABNORMAL MAMMOGRAM: ICD-10-CM

## 2017-12-11 PROCEDURE — 77061 BREAST TOMOSYNTHESIS UNI: CPT | Mod: TC,PO,LT

## 2017-12-11 PROCEDURE — 77065 DX MAMMO INCL CAD UNI: CPT | Mod: 26,LT,, | Performed by: RADIOLOGY

## 2017-12-11 PROCEDURE — 77061 BREAST TOMOSYNTHESIS UNI: CPT | Mod: 26,LT,, | Performed by: RADIOLOGY

## 2017-12-11 NOTE — TELEPHONE ENCOUNTER
----- Message from Corina Ascencio sent at 12/11/2017  3:12 PM CST -----  Contact: Patient  Patient called to speak with the nurse; she would like something to be called in for Diarrhea (nothing OTC works). She has had it since last Wed and even had an accident while running errands today.    [facility]  [address] [address]  Phone: [phone] Fax: [phone]    She can be contacted at [phone].    Thanks,  Corina

## 2017-12-11 NOTE — TELEPHONE ENCOUNTER
Spoke to patient and advised patient that she needs to be seen. She states that she has an oral surgery for consult. She is wonder if something can be sent in.

## 2017-12-12 NOTE — TELEPHONE ENCOUNTER
Diarrhea is a problem that we need a visit to discuss.  This not a problem that I can  Just treat over the phone.  Thanks.

## 2017-12-13 ENCOUNTER — OFFICE VISIT (OUTPATIENT)
Dept: FAMILY MEDICINE | Facility: CLINIC | Age: 72
End: 2017-12-13
Payer: MEDICARE

## 2017-12-13 ENCOUNTER — LAB VISIT (OUTPATIENT)
Dept: LAB | Facility: HOSPITAL | Age: 72
End: 2017-12-13
Attending: FAMILY MEDICINE
Payer: MEDICARE

## 2017-12-13 VITALS
HEIGHT: 60 IN | WEIGHT: 146.63 LBS | SYSTOLIC BLOOD PRESSURE: 99 MMHG | BODY MASS INDEX: 28.79 KG/M2 | DIASTOLIC BLOOD PRESSURE: 70 MMHG | TEMPERATURE: 97 F | OXYGEN SATURATION: 96 % | HEART RATE: 70 BPM | RESPIRATION RATE: 16 BRPM

## 2017-12-13 DIAGNOSIS — R19.7 DIARRHEA, UNSPECIFIED TYPE: Primary | ICD-10-CM

## 2017-12-13 DIAGNOSIS — I10 ESSENTIAL HYPERTENSION: ICD-10-CM

## 2017-12-13 DIAGNOSIS — R19.7 DIARRHEA, UNSPECIFIED TYPE: ICD-10-CM

## 2017-12-13 DIAGNOSIS — K05.10 GINGIVITIS: ICD-10-CM

## 2017-12-13 DIAGNOSIS — K04.7 TOOTH INFECTION: ICD-10-CM

## 2017-12-13 PROCEDURE — 87209 SMEAR COMPLEX STAIN: CPT

## 2017-12-13 PROCEDURE — 87045 FECES CULTURE AEROBIC BACT: CPT

## 2017-12-13 PROCEDURE — 87427 SHIGA-LIKE TOXIN AG IA: CPT

## 2017-12-13 PROCEDURE — 87046 STOOL CULTR AEROBIC BACT EA: CPT | Mod: 59

## 2017-12-13 PROCEDURE — 99999 PR PBB SHADOW E&M-EST. PATIENT-LVL IV: CPT | Mod: PBBFAC,,, | Performed by: FAMILY MEDICINE

## 2017-12-13 PROCEDURE — 99214 OFFICE O/P EST MOD 30 MIN: CPT | Mod: S$GLB,,, | Performed by: FAMILY MEDICINE

## 2017-12-13 RX ORDER — DIPHENOXYLATE HYDROCHLORIDE AND ATROPINE SULFATE 2.5; .025 MG/1; MG/1
1 TABLET ORAL 4 TIMES DAILY PRN
Qty: 40 TABLET | Refills: 0 | Status: SHIPPED | OUTPATIENT
Start: 2017-12-13 | End: 2017-12-23

## 2017-12-13 RX ORDER — DIPHENOXYLATE HYDROCHLORIDE AND ATROPINE SULFATE 2.5; .025 MG/1; MG/1
1 TABLET ORAL 4 TIMES DAILY PRN
Qty: 40 TABLET | Refills: 0 | Status: SHIPPED | OUTPATIENT
Start: 2017-12-13 | End: 2017-12-13 | Stop reason: SDUPTHER

## 2017-12-13 NOTE — PROGRESS NOTES
Subjective:      Patient ID: Yara Pradhan is a 72 y.o. female.    Chief Complaint: Diarrhea      Past Medical History:   Diagnosis Date    Anxiety 4/13/2017    Arthritis     Back pain     Drug-induced constipation 4/13/2017    Hypercholesterolemia 4/13/2017    Hypertension     Moderate episode of recurrent major depressive disorder 4/13/2017    Osteoporosis     Seasonal allergic rhinitis due to pollen 4/13/2017    Sleep apnea     Tobacco dependence     Urinary incontinence      Past Surgical History:   Procedure Laterality Date    back surgeries, multiple N/A     HYSTERECTOMY      KATHLEEN    LAMINECTOMY THORACIC SPINE W/ PLACEMENT SPINAL CORD STIMULATOR      Dr. Luu manages     Family History   Problem Relation Age of Onset    Heart attack Mother     Hypertension Mother     Heart disease Mother     Heart attack Father     Hypertension Father     Heart disease Father     Diabetes Neg Hx     Cancer Neg Hx     Stroke Neg Hx     Kidney disease Neg Hx      Social History     Social History    Marital status:      Spouse name: N/A    Number of children: N/A    Years of education: N/A     Occupational History    Not on file.     Social History Main Topics    Smoking status: Former Smoker     Quit date: 5/30/2017    Smokeless tobacco: Never Used    Alcohol use Yes    Drug use: No    Sexual activity: No     Other Topics Concern    Not on file     Social History Narrative    No narrative on file       Current Outpatient Prescriptions:     AMITIZA 24 mcg Cap, TAKE 1 CAPSULE TWICE DAILY WITH MEALS, Disp: 180 capsule, Rfl: 3    ascorbic acid, vitamin C, (VITAMIN C) 500 MG tablet, Take 500 mg by mouth once daily., Disp: , Rfl:     aspirin (ECOTRIN) 81 MG EC tablet, Take 81 mg by mouth once daily., Disp: , Rfl:     atorvastatin (LIPITOR) 40 MG tablet, Take 1 tablet (40 mg total) by mouth once daily., Disp: 90 tablet, Rfl: 3    carvedilol (COREG) 12.5 MG tablet, TAKE 1 TABLET TWICE  DAILY, Disp: 180 tablet, Rfl: 3    cranberry 400 mg Cap, Take 1 tablet by mouth., Disp: , Rfl:     doxycycline hyclate (DORYX) 200 mg EC tablet, Take 200 mg by mouth 2 (two) times daily., Disp: , Rfl:     fish oil-omega-3 fatty acids 300-1,000 mg capsule, Take 2 g by mouth once daily., Disp: , Rfl:     Lactobacillus rhamnosus GG (CULTURELLE) 10 billion cell capsule, Take 1 capsule by mouth once daily., Disp: , Rfl:     lorazepam (ATIVAN) 1 MG tablet, TAKE 1 TABLET TWICE DAILY, Disp: 180 tablet, Rfl: 1    montelukast (SINGULAIR) 10 mg tablet, Take 1 tablet (10 mg total) by mouth once daily., Disp: 90 tablet, Rfl: 3    pantoprazole (PROTONIX) 40 MG tablet, Take 1 tablet (40 mg total) by mouth once daily., Disp: 90 tablet, Rfl: 3    tramadol (ULTRAM) 50 mg tablet, , Disp: , Rfl:     valsartan-hydrochlorothiazide (DIOVAN-HCT) 320-25 mg per tablet, Take 1 tablet by mouth once daily., Disp: 90 tablet, Rfl: 3    venlafaxine (EFFEXOR-XR) 75 MG 24 hr capsule, Take 3 capsules (225 mg total) by mouth once daily., Disp: 90 capsule, Rfl: 0    vitamin D (VITAMIN D3) 1000 units Tab, Take 1,000 Units by mouth once daily., Disp: , Rfl:     zolpidem (AMBIEN) 5 MG Tab, Take 1 tablet (5 mg total) by mouth nightly as needed., Disp: 30 tablet, Rfl: 2    diphenoxylate-atropine 2.5-0.025 mg (LOMOTIL) 2.5-0.025 mg per tablet, Take 1 tablet by mouth 4 (four) times daily as needed for Diarrhea., Disp: 40 tablet, Rfl: 0  Review of patient's allergies indicates:   Allergen Reactions    Lortab [hydrocodone-acetaminophen] Anaphylaxis and Swelling    Codeine Itching and Nausea Only    Percocet [oxycodone-acetaminophen] Nausea Only       Review of Systems   Constitutional: Negative for chills and fever.   Respiratory: Negative for shortness of breath and wheezing.    Cardiovascular: Negative for chest pain and palpitations.   Gastrointestinal: Positive for diarrhea. Negative for abdominal pain, blood in stool, nausea and rectal  pain.     Diarrhea    Pertinent negatives include no abdominal pain, chills or fever.   A week ago started an antibiotic for tooth infection.  She started having diarrhea - became profuse watery.  Persistent symptoms for over a week - now with 3 unformed stools a day and at times leakage.  No abdominal pain, fevers, chills.      Objective:   BP 99/70 (BP Location: Right arm, Patient Position: Sitting, BP Method: Small (Manual))   Pulse 70   Temp 96.9 °F (36.1 °C) (Tympanic)   Resp 16   Ht 5' (1.524 m)   Wt 66.5 kg (146 lb 9.7 oz)   SpO2 96%   BMI 28.63 kg/m²      Physical Exam   Constitutional: She is oriented to person, place, and time. She is cooperative. No distress.   Eyes: Conjunctivae and EOM are normal.   Cardiovascular: Normal rate, regular rhythm and normal heart sounds.    Pulmonary/Chest: Effort normal and breath sounds normal.   Abdominal: Soft. She exhibits no distension. There is no tenderness.   Musculoskeletal: She exhibits no edema.   Neurological: She is alert and oriented to person, place, and time. Coordination and gait normal.   Skin: Skin is warm, dry and intact. No rash noted. She is not diaphoretic. No erythema.   Psychiatric: She has a normal mood and affect. Her speech is normal and behavior is normal.   Nursing note and vitals reviewed.          Assessment:       1. Diarrhea, unspecified type    2. Tooth infection    3. Gingivitis    4. Essential hypertension            Plan:         Diarrhea, unspecified type  Comments:  Check stool cultures.  Start lomotil after collection.  Call with results.  She has held amitiza.  Orders:  -     Stool culture; Future; Expected date: 12/13/2017  -     Stool Exam-Ova,Cysts,Parasites; Future; Expected date: 12/13/2017  -     CLOSTRIDIUM DIFFICILE; Future; Expected date: 12/13/2017    Tooth infection  Comments:  Upcoming root canal.  Unable to tolerate abx due to diarrhea that started and has not stopped.    Gingivitis  Comments:  Has taken daily  doxycycline for a long time.    Essential hypertension  Comments:  Well controlled.    Other orders  -     Discontinue: diphenoxylate-atropine 2.5-0.025 mg (LOMOTIL) 2.5-0.025 mg per tablet; Take 1 tablet by mouth 4 (four) times daily as needed for Diarrhea.  Dispense: 40 tablet; Refill: 0  -     diphenoxylate-atropine 2.5-0.025 mg (LOMOTIL) 2.5-0.025 mg per tablet; Take 1 tablet by mouth 4 (four) times daily as needed for Diarrhea.  Dispense: 40 tablet; Refill: 0        Patient Care Team:  Naima Womack MD as PCP - General (Family Medicine)  Chad Luu MD as Consulting Physician (Pain Medicine)  Francisco Amaya MD as Consulting Physician (Gastroenterology)  Xavi Ridley IV, MD (Vascular Surgery)  Lauro Lopez MD (Ophthalmology)    Return if symptoms worsen or fail to improve.

## 2017-12-14 LAB — O+P STL TRI STN: NORMAL

## 2017-12-15 ENCOUNTER — TELEPHONE (OUTPATIENT)
Dept: FAMILY MEDICINE | Facility: CLINIC | Age: 72
End: 2017-12-15

## 2017-12-15 LAB
E COLI SXT1 STL QL IA: NEGATIVE
E COLI SXT2 STL QL IA: NEGATIVE

## 2017-12-15 RX ORDER — SULFAMETHOXAZOLE AND TRIMETHOPRIM 800; 160 MG/1; MG/1
1 TABLET ORAL 2 TIMES DAILY
Qty: 14 TABLET | Refills: 0 | Status: SHIPPED | OUTPATIENT
Start: 2017-12-15 | End: 2017-12-22

## 2017-12-15 RX ORDER — METRONIDAZOLE 500 MG/1
500 TABLET ORAL 3 TIMES DAILY
Qty: 21 TABLET | Refills: 0 | Status: SHIPPED | OUTPATIENT
Start: 2017-12-15 | End: 2017-12-22

## 2017-12-15 RX ORDER — FLUCONAZOLE 150 MG/1
150 TABLET ORAL DAILY
Qty: 1 TABLET | Refills: 1 | Status: SHIPPED | OUTPATIENT
Start: 2017-12-15 | End: 2017-12-16

## 2017-12-15 NOTE — TELEPHONE ENCOUNTER
Spoke to patient and she states that she is having itching. She would like to have something called in for a yeast infection. She states that she is still having the diarrhea still.

## 2017-12-15 NOTE — TELEPHONE ENCOUNTER
----- Message from Tyler Bellamy sent at 12/15/2017 11:58 AM CST -----  Contact: pt  She's calling in regards to a new RX for a yeast infection, pt states she still has diarrhea, [facility] [address], [phone] (home)

## 2017-12-15 NOTE — TELEPHONE ENCOUNTER
Stool cultures to date are negative.  Calling in antibiotics for small bowel overgrowth syndrome. Called in diflucan for yeast - one now and repeat I n10 days.

## 2017-12-16 ENCOUNTER — NURSE TRIAGE (OUTPATIENT)
Dept: ADMINISTRATIVE | Facility: CLINIC | Age: 72
End: 2017-12-16

## 2017-12-16 NOTE — TELEPHONE ENCOUNTER
"  Answer Assessment - Initial Assessment Questions  1. SYMPTOMS: "Do you have any symptoms?"      Diarrhea---Rx transfer request----Rx went to mail order; Rx info called/accepted local pharmacy.   2. SEVERITY: If symptoms are present, ask "Are they mild, moderate or severe?"      Mod-severe    Protocols used: ST MEDICATION QUESTION CALL-A-    "

## 2017-12-17 LAB — BACTERIA STL CULT: NORMAL

## 2018-01-02 DIAGNOSIS — F41.9 ANXIETY: Chronic | ICD-10-CM

## 2018-01-02 RX ORDER — LORAZEPAM 1 MG/1
1 TABLET ORAL 2 TIMES DAILY
Qty: 60 TABLET | Refills: 0 | Status: SHIPPED | OUTPATIENT
Start: 2018-01-02 | End: 2018-02-19 | Stop reason: SDUPTHER

## 2018-01-02 NOTE — TELEPHONE ENCOUNTER
----- Message from Josie Armstrong sent at 1/2/2018  9:32 AM CST -----  Pt need a refill on larazapan through mail order/'/ pt can be reach at [phone].

## 2018-02-13 NOTE — TELEPHONE ENCOUNTER
Please let patient know that Benjamin is no longer receiving controlled substances - does she want a local pharmacy?   Pt states elevated blood sugars over the night. Pt c/o abdominal pain and thoracic back pain. Pt states was at the [facility] last week Friday with dehydration. States had vomiting and diarrhea last week for 2 days. Pt states has chronic pancreatitis.

## 2018-02-19 DIAGNOSIS — F41.9 ANXIETY: Chronic | ICD-10-CM

## 2018-02-19 RX ORDER — LORAZEPAM 1 MG/1
1 TABLET ORAL 2 TIMES DAILY
Qty: 60 TABLET | Refills: 0 | Status: SHIPPED | OUTPATIENT
Start: 2018-02-19 | End: 2018-03-12 | Stop reason: SDUPTHER

## 2018-02-19 NOTE — TELEPHONE ENCOUNTER
----- Message from Loli Montejo sent at 2/19/2018 10:29 AM CST -----  Contact: PT  1. What is the name of the medication you are requesting? Larazapam  2. What is the dose? 1 mg  3. How do you take the medication? Orally, topically, etc? orally  4. How often do you take this medication? Twice a day  5. Do you need a 30 day or 90 day supply? 30  6. How many refills are you requesting? 3  7. What is your preferred pharmacy and location of the pharmacy?   [facility]  [address] [place] [address]  Phone: [phone] Fax: [phone]  8. Who can we contact with further questions? Pt at [phone]  Thank you

## 2018-03-12 DIAGNOSIS — J30.1 SEASONAL ALLERGIC RHINITIS DUE TO POLLEN: Chronic | ICD-10-CM

## 2018-03-12 DIAGNOSIS — F41.9 ANXIETY: Chronic | ICD-10-CM

## 2018-03-12 RX ORDER — MONTELUKAST SODIUM 10 MG/1
TABLET ORAL
Qty: 90 TABLET | Refills: 3 | Status: SHIPPED | OUTPATIENT
Start: 2018-03-12

## 2018-03-12 RX ORDER — LORAZEPAM 1 MG/1
1 TABLET ORAL 2 TIMES DAILY
Qty: 60 TABLET | Refills: 0 | Status: SHIPPED | OUTPATIENT
Start: 2018-03-12 | End: 2018-04-18 | Stop reason: SDUPTHER

## 2018-03-12 RX ORDER — PANTOPRAZOLE SODIUM 40 MG/1
TABLET, DELAYED RELEASE ORAL
Qty: 90 TABLET | Refills: 3 | Status: SHIPPED | OUTPATIENT
Start: 2018-03-12 | End: 2018-06-25 | Stop reason: SDUPTHER

## 2018-03-12 NOTE — TELEPHONE ENCOUNTER
----- Message from Kelsey Lindquist sent at 3/12/2018 10:26 AM CDT -----  Contact: pt   Calling about Rx refill and please advise [phone] (home)

## 2018-03-20 DIAGNOSIS — I10 ESSENTIAL HYPERTENSION: Chronic | ICD-10-CM

## 2018-03-20 RX ORDER — VALSARTAN AND HYDROCHLOROTHIAZIDE 320; 25 MG/1; MG/1
TABLET, FILM COATED ORAL
Qty: 90 TABLET | Refills: 3 | Status: SHIPPED | OUTPATIENT
Start: 2018-03-20 | End: 2018-06-25 | Stop reason: SDUPTHER

## 2018-03-26 ENCOUNTER — OFFICE VISIT (OUTPATIENT)
Dept: FAMILY MEDICINE | Facility: CLINIC | Age: 73
End: 2018-03-26
Payer: MEDICARE

## 2018-03-26 ENCOUNTER — PATIENT OUTREACH (OUTPATIENT)
Dept: ADMINISTRATIVE | Facility: HOSPITAL | Age: 73
End: 2018-03-26

## 2018-03-26 ENCOUNTER — LAB VISIT (OUTPATIENT)
Dept: LAB | Facility: HOSPITAL | Age: 73
End: 2018-03-26
Payer: MEDICARE

## 2018-03-26 VITALS
WEIGHT: 144.38 LBS | OXYGEN SATURATION: 97 % | DIASTOLIC BLOOD PRESSURE: 78 MMHG | HEART RATE: 70 BPM | SYSTOLIC BLOOD PRESSURE: 110 MMHG | RESPIRATION RATE: 16 BRPM | HEIGHT: 60 IN | TEMPERATURE: 97 F | BODY MASS INDEX: 28.35 KG/M2

## 2018-03-26 DIAGNOSIS — I10 ESSENTIAL HYPERTENSION: Primary | ICD-10-CM

## 2018-03-26 DIAGNOSIS — I77.9 BILATERAL CAROTID ARTERY DISEASE: ICD-10-CM

## 2018-03-26 DIAGNOSIS — E78.00 HYPERCHOLESTEROLEMIA: ICD-10-CM

## 2018-03-26 DIAGNOSIS — F33.1 MODERATE EPISODE OF RECURRENT MAJOR DEPRESSIVE DISORDER: Chronic | ICD-10-CM

## 2018-03-26 DIAGNOSIS — I73.9 PERIPHERAL VASCULAR DISEASE: ICD-10-CM

## 2018-03-26 DIAGNOSIS — R21 RASH AND NONSPECIFIC SKIN ERUPTION: ICD-10-CM

## 2018-03-26 LAB
ALBUMIN SERPL BCP-MCNC: 3.5 G/DL
ALP SERPL-CCNC: 103 U/L
ALT SERPL W/O P-5'-P-CCNC: 16 U/L
ANION GAP SERPL CALC-SCNC: 10 MMOL/L
AST SERPL-CCNC: 21 U/L
BILIRUB SERPL-MCNC: 0.5 MG/DL
BUN SERPL-MCNC: 21 MG/DL
CALCIUM SERPL-MCNC: 9.6 MG/DL
CHLORIDE SERPL-SCNC: 102 MMOL/L
CHOLEST SERPL-MCNC: 169 MG/DL
CHOLEST/HDLC SERPL: 2.9 {RATIO}
CO2 SERPL-SCNC: 27 MMOL/L
CREAT SERPL-MCNC: 1 MG/DL
EST. GFR  (AFRICAN AMERICAN): >60 ML/MIN/1.73 M^2
EST. GFR  (NON AFRICAN AMERICAN): 57.2 ML/MIN/1.73 M^2
GLUCOSE SERPL-MCNC: 94 MG/DL
HDLC SERPL-MCNC: 58 MG/DL
HDLC SERPL: 34.3 %
LDLC SERPL CALC-MCNC: 91.8 MG/DL
NONHDLC SERPL-MCNC: 111 MG/DL
POTASSIUM SERPL-SCNC: 4.4 MMOL/L
PROT SERPL-MCNC: 6.9 G/DL
SODIUM SERPL-SCNC: 139 MMOL/L
TRIGL SERPL-MCNC: 96 MG/DL

## 2018-03-26 PROCEDURE — 36415 COLL VENOUS BLD VENIPUNCTURE: CPT | Mod: PO

## 2018-03-26 PROCEDURE — 99999 PR PBB SHADOW E&M-EST. PATIENT-LVL V: CPT | Mod: PBBFAC,,, | Performed by: FAMILY MEDICINE

## 2018-03-26 PROCEDURE — 99214 OFFICE O/P EST MOD 30 MIN: CPT | Mod: S$GLB,,, | Performed by: FAMILY MEDICINE

## 2018-03-26 PROCEDURE — 3078F DIAST BP <80 MM HG: CPT | Mod: CPTII,S$GLB,, | Performed by: FAMILY MEDICINE

## 2018-03-26 PROCEDURE — 80061 LIPID PANEL: CPT

## 2018-03-26 PROCEDURE — 3074F SYST BP LT 130 MM HG: CPT | Mod: CPTII,S$GLB,, | Performed by: FAMILY MEDICINE

## 2018-03-26 PROCEDURE — 80053 COMPREHEN METABOLIC PANEL: CPT

## 2018-03-26 RX ORDER — CARVEDILOL 12.5 MG/1
12.5 TABLET ORAL 2 TIMES DAILY
Qty: 180 TABLET | Refills: 3 | Status: SHIPPED | OUTPATIENT
Start: 2018-03-26

## 2018-03-26 RX ORDER — CLOTRIMAZOLE AND BETAMETHASONE DIPROPIONATE 10; .64 MG/G; MG/G
CREAM TOPICAL 2 TIMES DAILY
Qty: 45 G | Refills: 0 | Status: SHIPPED | OUTPATIENT
Start: 2018-03-26 | End: 2018-04-09

## 2018-03-26 RX ORDER — DICYCLOMINE HYDROCHLORIDE 10 MG/1
CAPSULE ORAL
Refills: 0 | COMMUNITY
Start: 2018-02-16

## 2018-03-26 RX ORDER — DIPHENOXYLATE HYDROCHLORIDE AND ATROPINE SULFATE 2.5; .025 MG/1; MG/1
TABLET ORAL
Refills: 0 | COMMUNITY
Start: 2018-03-02

## 2018-03-26 NOTE — PROGRESS NOTES
Subjective:      Patient ID: Yara Pradhan is a 72 y.o. female.    Chief Complaint: Medication Review and Hypertension      Past Medical History:   Diagnosis Date    Anxiety 4/13/2017    Arthritis     Back pain     Drug-induced constipation 4/13/2017    Hypercholesterolemia 4/13/2017    Hypertension     Moderate episode of recurrent major depressive disorder 4/13/2017    Osteoporosis     Seasonal allergic rhinitis due to pollen 4/13/2017    Sleep apnea     Tobacco dependence     Urinary incontinence      Past Surgical History:   Procedure Laterality Date    back surgeries, multiple N/A     HYSTERECTOMY      KATHLEEN    LAMINECTOMY THORACIC SPINE W/ PLACEMENT SPINAL CORD STIMULATOR      Dr. Luu manages     Family History   Problem Relation Age of Onset    Heart attack Mother     Hypertension Mother     Heart disease Mother     Heart attack Father     Hypertension Father     Heart disease Father     Diabetes Neg Hx     Cancer Neg Hx     Stroke Neg Hx     Kidney disease Neg Hx      Social History     Social History    Marital status:      Spouse name: N/A    Number of children: N/A    Years of education: N/A     Occupational History    Not on file.     Social History Main Topics    Smoking status: Former Smoker     Quit date: 5/30/2017    Smokeless tobacco: Never Used    Alcohol use Yes    Drug use: No    Sexual activity: No     Other Topics Concern    Not on file     Social History Narrative    No narrative on file       Current Outpatient Prescriptions:     ascorbic acid, vitamin C, (VITAMIN C) 500 MG tablet, Take 500 mg by mouth once daily., Disp: , Rfl:     aspirin (ECOTRIN) 81 MG EC tablet, Take 81 mg by mouth once daily., Disp: , Rfl:     atorvastatin (LIPITOR) 40 MG tablet, Take 1 tablet (40 mg total) by mouth once daily., Disp: 90 tablet, Rfl: 3    carvedilol (COREG) 12.5 MG tablet, Take 1 tablet (12.5 mg total) by mouth 2 (two) times daily., Disp: 180 tablet,  Rfl: 3    cranberry 400 mg Cap, Take 1 tablet by mouth., Disp: , Rfl:     dicyclomine (BENTYL) 10 MG capsule, take 1 capsule by mouth every 8 hours if needed, Disp: , Rfl: 0    doxycycline hyclate (DORYX) 200 mg EC tablet, Take 200 mg by mouth 2 (two) times daily., Disp: , Rfl:     fish oil-omega-3 fatty acids 300-1,000 mg capsule, Take 2 g by mouth once daily., Disp: , Rfl:     Lactobacillus rhamnosus GG (CULTURELLE) 10 billion cell capsule, Take 1 capsule by mouth once daily., Disp: , Rfl:     LORazepam (ATIVAN) 1 MG tablet, Take 1 tablet (1 mg total) by mouth 2 (two) times daily., Disp: 60 tablet, Rfl: 0    montelukast (SINGULAIR) 10 mg tablet, TAKE 1 TABLET ONE TIME DAILY, Disp: 90 tablet, Rfl: 3    pantoprazole (PROTONIX) 40 MG tablet, TAKE 1 TABLET ONE TIME DAILY, Disp: 90 tablet, Rfl: 3    tramadol (ULTRAM) 50 mg tablet, , Disp: , Rfl:     valsartan-hydrochlorothiazide (DIOVAN-HCT) 320-25 mg per tablet, TAKE 1 TABLET ONE TIME DAILY, Disp: 90 tablet, Rfl: 3    venlafaxine (EFFEXOR-XR) 75 MG 24 hr capsule, Take 3 capsules (225 mg total) by mouth once daily., Disp: 90 capsule, Rfl: 0    vitamin D (VITAMIN D3) 1000 units Tab, Take 1,000 Units by mouth once daily., Disp: , Rfl:     zolpidem (AMBIEN) 5 MG Tab, Take 1 tablet (5 mg total) by mouth nightly as needed., Disp: 30 tablet, Rfl: 2    clotrimazole-betamethasone 1-0.05% (LOTRISONE) cream, Apply topically 2 (two) times daily., Disp: 45 g, Rfl: 0    diphenoxylate-atropine 2.5-0.025 mg (LOMOTIL) 2.5-0.025 mg per tablet, , Disp: , Rfl: 0  Review of patient's allergies indicates:   Allergen Reactions    Lortab [hydrocodone-acetaminophen] Anaphylaxis and Swelling    Codeine Itching and Nausea Only    Percocet [oxycodone-acetaminophen] Nausea Only       Review of Systems   Constitutional: Negative for activity change and unexpected weight change.   HENT: Negative for hearing loss, rhinorrhea and trouble swallowing.    Eyes: Negative for discharge  and visual disturbance.   Respiratory: Negative for chest tightness.    Cardiovascular: Negative for chest pain and palpitations.   Gastrointestinal: Negative for blood in stool, diarrhea and vomiting.   Endocrine: Negative for polydipsia and polyuria.   Genitourinary: Negative for difficulty urinating, dysuria, hematuria and menstrual problem.   Musculoskeletal: Negative for neck pain.   Neurological: Negative for headaches.   Psychiatric/Behavioral: Negative for confusion and dysphoric mood.     HPI  Here today for recheck.  Diarrhea resolved.  She saw Dr. Pyle who put her on another round of antibiotics.  She is feeling much better. Diarrhea resolved.  Diocyclomine prn helps with IBS.  BP's well controlled-  Needs refill.  Due for cholesterol recheck.  Depression well controlled.  Erythematous rash on neck - once a year - clears with lostrisone.  Objective:   /78   Pulse 70   Temp 97.2 °F (36.2 °C) (Tympanic)   Resp 16   Ht 5' (1.524 m)   Wt 65.5 kg (144 lb 6.4 oz)   SpO2 97%   BMI 28.20 kg/m²      Physical Exam   Constitutional: She is oriented to person, place, and time. She is cooperative. No distress.   Eyes: Conjunctivae and EOM are normal.   Cardiovascular: Normal rate, regular rhythm and normal heart sounds.    Pulmonary/Chest: Effort normal and breath sounds normal.   Abdominal: Soft. She exhibits no distension. There is no tenderness.   Musculoskeletal: She exhibits no edema.   Neurological: She is alert and oriented to person, place, and time. Coordination and gait normal.   Skin: Skin is warm, dry and intact. She is not diaphoretic.   Erythema around neck - right side - mild intertrigo of neck folds?   Psychiatric: She has a normal mood and affect. Her speech is normal and behavior is normal.   Nursing note and vitals reviewed.          Assessment:       1. Essential hypertension    2. Bilateral carotid artery disease    3. Peripheral vascular disease    4. Hypercholesterolemia     5. Moderate episode of recurrent major depressive disorder    6. Rash and nonspecific skin eruption            Plan:         Essential hypertension  Comments:  BP's well controlled.  Coreg refilled.  Orders:  -     carvedilol (COREG) 12.5 MG tablet; Take 1 tablet (12.5 mg total) by mouth 2 (two) times daily.  Dispense: 180 tablet; Refill: 3    Bilateral carotid artery disease    Peripheral vascular disease  Comments:  Stable.    Hypercholesterolemia  Comments:  Due for recheck of labs - call with results.  Orders:  -     Lipid panel; Future; Expected date: 03/26/2018  -     Comprehensive metabolic panel; Future; Expected date: 03/26/2018    Moderate episode of recurrent major depressive disorder  Comments:  Well controlled.    Rash and nonspecific skin eruption  Comments:  Erythematous rash on neck - once a year - clears with lostrisone.    Other orders  -     clotrimazole-betamethasone 1-0.05% (LOTRISONE) cream; Apply topically 2 (two) times daily.  Dispense: 45 g; Refill: 0        Patient Care Team:  Naima Womack MD as PCP - General (Family Medicine)  Chad Luu MD as Consulting Physician (Pain Medicine)  Francisco Amaya MD as Consulting Physician (Gastroenterology)  Xavi Ridley IV, MD (Vascular Surgery)  Lauro Lopez MD (Ophthalmology)  Garry Garcia LPN as Care Coordinator    Follow-up in about 6 months (around 9/26/2018) for review of bp/meds, review labs.

## 2018-03-26 NOTE — PATIENT INSTRUCTIONS
Mediterranean Food Guide   People who live in the area around the [place] have a lower risk of heart disease. Researchers have recently shown that following a Mediterranean diet decreases heart disease by 30% in people whom are at-risk.   This lifestyle is built upon daily exercise along with a lot of fruit, vegetables, plant-based proteins, whole grains, fish and smaller amounts of poultry and red meat. Fatty fish (salmon), olive oil, and nuts make this diet higher in fat than the classic heart healthy diet. These fats are mostly unsaturated, and when consumed in place of saturated fat, are good for the heart. The pyramid above and the chart on the next page describe the types of food and serving sizes in this heart healthy meal plan.   Physical Activity- The Mediterranean Diet pyramid is built upon daily physical activity and exercise. Aim for at least 150 minutes of moderate to vigorous exercise every week. Moderate-to-vigorous exercises include walking at a brisk pace, biking, or swimming, among other activities that elevate your heart rate. Always choose activities that you enjoy and that are safe, in order to be active throughout your life.   Achieve and Maintain a Healthy Weight - The high fat content of the Mediterranean is healthy for your heart, but may lead to increased energy intake and weight gain if you dont pay attention to how much you are eating. If you are trying to lose weight, choose the smaller number of servings from each food group, and try to make your serving sizes match those listed. 2   Food Groups and Servings per day  Serving Sizes    Non-starchy Vegetables   4-8 servings per day  One serving is ½ cup of cooked vegetables or 1 cup raw vegetables   Non-starchy vegetables include artichoke, asparagus, beets, broccoli, Delta sprouts, cauliflower, cabbage, celery, carrots, tomatoes, eggplant, cucumber, onion, green and wax beans, zucchini, turnips, peppers, salad greens  and mushrooms. (Potatoes, peas, and corn are starchy vegetables.)    Fruit   2-4 servings per day  One serving is a small fresh fruit or ½ cup juice or ¼ cup dried fruit   Fresh fruits are preferred because of the fiber and other nutrients they contain. Fruits canned in light syrup or their own juice, and frozen fruit with little or no added sugar are also good choices. Use only small amounts of fruit juice (6 oz per day or less), since even unsweetened juices can contain as much sugar as regular soda.    Legumes and Nuts   1-3 servings per day  Legumes: One serving is ½ cup cooked kidney, black, garbanzo, prasad, soy (edamame), navy beans, split peas, or lentils, or ¼ cup fat free refried beans or baked beans   Nuts and Seeds: One serving is 2 Tbsp. sunflower or sesame seeds, 1 Tbsp. peanut butter,   7-8 walnuts or pecans, 20 peanuts, or 12-15 almonds   Aim for 1-2 servings of nuts or seeds and 1-2 servings of legumes per day. Legumes are high in fiber, protein, and minerals. Nuts are high in unsaturated fat, and may increase HDL without increasing LDL cholesterol levels.    Low-fat Dairy Products   1-3 servings per day  One serving is 1 cup of skim milk, non-fat yogurt, or 1oz of low-fat (part-skim) cheese   Calcium-fortified soy milk, soy yogurt, and soy cheese can take the place of dairy products. If servings of dairy or fortified soy are less than 2 per day, we advise a calcium and vitamin D supplement.    Fish or shellfish   2-3 times a week  One serving is 3 ounces (about the size of a deck of cards)   Cook fish by baking, sautéing, broiling, roasting, grilling, or poaching. Choose fatty fishes like salmon, herring, sardines, or mackerel often. The fat in fish is high in omega-3 fats, so it has healthy effects on triglycerides and blood cells.    Poultry, if desired   1-3 times a week  One serving is 3 ounces (about the size of a deck of cards)   Bake, sauté, stir hooper, roast, or grill the poultry you eat, and  eat it without the skin.    Whole Grains and Starchy Vegetables   4-6 servings per day  One serving is about 1 ounce of any of these:   1 slice whole wheat bread ½ cup potatoes, sweet potatoes, corn, or peas   ½ large whole grain bun 1 small whole grain roll   6-inch whole wheat raj 6 whole grain crackers   ½ cup cooked whole grain cereal (oatmeal, cracked wheat, quinoa)   ½ cup cooked whole wheat pasta, brown rice, or barley   Whole grains are high in fiber and have less effect on blood sugar and triglyceride levels than refined, processed grains like white bread and pasta. Whole grains also keep the stomach full longer, making it easier to control hunger.

## 2018-04-11 ENCOUNTER — TELEPHONE (OUTPATIENT)
Dept: FAMILY MEDICINE | Facility: CLINIC | Age: 73
End: 2018-04-11

## 2018-04-11 NOTE — TELEPHONE ENCOUNTER
Spoke to patient and she wanted to make sure the Carvedilol was order. Advised her that this medication was refilled but is soon to be sent out to her.

## 2018-04-11 NOTE — TELEPHONE ENCOUNTER
----- Message from Meron Sheehan sent at 4/11/2018  4:24 PM CDT -----  Contact: pt  She's calling to discuss medication, please advise [phone] (home)

## 2018-04-18 DIAGNOSIS — F41.9 ANXIETY: Chronic | ICD-10-CM

## 2018-04-18 RX ORDER — LORAZEPAM 1 MG/1
1 TABLET ORAL 2 TIMES DAILY
Qty: 60 TABLET | Refills: 3 | Status: SHIPPED | OUTPATIENT
Start: 2018-04-18 | End: 2018-08-16 | Stop reason: SDUPTHER

## 2018-04-18 NOTE — TELEPHONE ENCOUNTER
----- Message from Kevinalex Nixon sent at 4/18/2018  3:08 PM CDT -----  Contact: Pt   ..1. What is the name of the medication you are requesting? lorazepam   2. What is the dose? 1 mgs   3. How do you take the medication? Orally, topically, etc? Orally   4. How often do you take this medication? Twice a day   5. Do you need a 30 day or 90 day supply? 90 day   6. How many refills are you requesting? 1  7. What is your preferred pharmacy and location of the pharmacy?       ..  ..    [id number] [facility] [address] [facility] [address]  [address] [place] [address]  Phone: [phone] Fax: [phone]                8. Who can we contact with further questions?

## 2018-04-20 ENCOUNTER — TELEPHONE (OUTPATIENT)
Dept: FAMILY MEDICINE | Facility: CLINIC | Age: 73
End: 2018-04-20

## 2018-04-20 NOTE — TELEPHONE ENCOUNTER
----- Message from Loli Montejo sent at 4/20/2018  9:19 AM CDT -----  Contact: pt  States she needs to speak to the nurse, she needs a refill on lorazepam. Please call pt at [phone]. Thank you

## 2018-04-22 DIAGNOSIS — E78.00 HYPERCHOLESTEROLEMIA: Chronic | ICD-10-CM

## 2018-04-22 RX ORDER — ATORVASTATIN CALCIUM 40 MG/1
TABLET, FILM COATED ORAL
Qty: 90 TABLET | Refills: 3 | Status: SHIPPED | OUTPATIENT
Start: 2018-04-22 | End: 2018-08-06 | Stop reason: SDUPTHER

## 2018-05-08 ENCOUNTER — TELEPHONE (OUTPATIENT)
Dept: FAMILY MEDICINE | Facility: CLINIC | Age: 73
End: 2018-05-08

## 2018-05-08 RX ORDER — TRAZODONE HYDROCHLORIDE 50 MG/1
50 TABLET ORAL NIGHTLY
Qty: 90 TABLET | Refills: 1 | Status: SHIPPED | OUTPATIENT
Start: 2018-05-08 | End: 2018-05-15 | Stop reason: ALTCHOICE

## 2018-05-08 NOTE — TELEPHONE ENCOUNTER
----- Message from Loretta Akers sent at 5/8/2018 11:14 AM CDT -----  Contact: self   Patient returning call. Please call back at [phone].      Thanks,  Loretta Akers

## 2018-05-14 ENCOUNTER — TELEPHONE (OUTPATIENT)
Dept: FAMILY MEDICINE | Facility: CLINIC | Age: 73
End: 2018-05-14

## 2018-05-14 NOTE — TELEPHONE ENCOUNTER
Pt states the last medication that was sent to her, she just can't take it. It leaves her way to groggy. (Trazadone) Says that it doesn't work as well. Takes forever for her to fall asleep. Would like to go back to the Zoloft. However, she says that Elavil (hopefully that's the correct spelling) is a medication that she used to take years ago with no side affects. She would rather that before going back to the Zoloft if possible.

## 2018-05-14 NOTE — TELEPHONE ENCOUNTER
Zoloft does not help with sleep.  Elavil does help with sleep.  Is she sure that she has taken zoloft for insomnia?

## 2018-05-14 NOTE — TELEPHONE ENCOUNTER
----- Message from Tyler Bellamy sent at 5/14/2018  1:21 PM CDT -----  Contact: pt  She's calling in regards to her Rx medication, [phone] (cell)

## 2018-05-15 ENCOUNTER — TELEPHONE (OUTPATIENT)
Dept: FAMILY MEDICINE | Facility: CLINIC | Age: 73
End: 2018-05-15

## 2018-05-15 RX ORDER — AMITRIPTYLINE HYDROCHLORIDE 25 MG/1
TABLET, FILM COATED ORAL
Qty: 60 TABLET | Refills: 2 | Status: SHIPPED | OUTPATIENT
Start: 2018-05-15 | End: 2018-08-09 | Stop reason: SDUPTHER

## 2018-05-15 NOTE — TELEPHONE ENCOUNTER
----- Message from Katie Edmondson sent at 5/15/2018  2:38 PM CDT -----  Contact: PT   Pt request call back to discuss the last medication she received .[phone] (home)

## 2018-06-25 DIAGNOSIS — F33.1 MODERATE EPISODE OF RECURRENT MAJOR DEPRESSIVE DISORDER: Chronic | ICD-10-CM

## 2018-06-25 DIAGNOSIS — I10 ESSENTIAL HYPERTENSION: Chronic | ICD-10-CM

## 2018-06-25 RX ORDER — VENLAFAXINE HYDROCHLORIDE 75 MG/1
225 CAPSULE, EXTENDED RELEASE ORAL DAILY
Qty: 90 CAPSULE | Refills: 0 | Status: SHIPPED | OUTPATIENT
Start: 2018-06-25 | End: 2018-07-22 | Stop reason: SDUPTHER

## 2018-06-25 RX ORDER — PANTOPRAZOLE SODIUM 40 MG/1
TABLET, DELAYED RELEASE ORAL
Qty: 90 TABLET | Refills: 3 | Status: SHIPPED | OUTPATIENT
Start: 2018-06-25

## 2018-06-25 RX ORDER — VALSARTAN AND HYDROCHLOROTHIAZIDE 320; 25 MG/1; MG/1
TABLET, FILM COATED ORAL
Qty: 90 TABLET | Refills: 3 | Status: SHIPPED | OUTPATIENT
Start: 2018-06-25 | End: 2018-07-31 | Stop reason: ALTCHOICE

## 2018-06-25 NOTE — TELEPHONE ENCOUNTER
----- Message from Chelsey Edwards sent at 6/25/2018  3:33 PM CDT -----  Contact: pt  She's calling in regards to Rx Montelukast 10mg, venlafaxine er 75mg,  dalsartan/[id number] mg, tantoprazole 40mg pls call pt back at [phone] (home)         [facility] - [place], [place] - [address]  [address]  [facility] [address]  Phone: [phone] Fax: [phone]

## 2018-07-22 DIAGNOSIS — F33.1 MODERATE EPISODE OF RECURRENT MAJOR DEPRESSIVE DISORDER: Chronic | ICD-10-CM

## 2018-07-23 RX ORDER — VENLAFAXINE HYDROCHLORIDE 75 MG/1
CAPSULE, EXTENDED RELEASE ORAL
Qty: 90 CAPSULE | Refills: 1 | Status: SHIPPED | OUTPATIENT
Start: 2018-07-23 | End: 2018-12-19 | Stop reason: SDUPTHER

## 2018-07-31 RX ORDER — LOSARTAN POTASSIUM AND HYDROCHLOROTHIAZIDE 25; 100 MG/1; MG/1
1 TABLET ORAL DAILY
Qty: 30 TABLET | Refills: 3 | Status: SHIPPED | OUTPATIENT
Start: 2018-07-31 | End: 2019-07-31

## 2018-07-31 RX ORDER — LOSARTAN POTASSIUM AND HYDROCHLOROTHIAZIDE 25; 100 MG/1; MG/1
1 TABLET ORAL DAILY
Qty: 90 TABLET | Refills: 3 | Status: SHIPPED | OUTPATIENT
Start: 2018-07-31 | End: 2018-07-31 | Stop reason: SDUPTHER

## 2018-07-31 NOTE — TELEPHONE ENCOUNTER
----- Message from Corina Ascencio sent at 7/31/2018 12:51 PM CDT -----  Contact: Patient  Patient called to speak with the nurse; she received a letter from [employer] that stated that her BP medication Valsartan is on Recall. She wants to know what the doctor would like her to do.    She can be contacted at [phone].    Thanks,  Corina

## 2018-07-31 NOTE — TELEPHONE ENCOUNTER
Called pt. Pt was notified by the pharmacy of valsartan recall and is awaiting a new Rx to be sent to her pharmacy. Routing encounter.

## 2018-07-31 NOTE — TELEPHONE ENCOUNTER
S/w patient. Patient states she doesn't have any right now. She would like a short supply sent to [id number]- [place], [place] -[address] [facility] [address]     3 month supply sent to [facility].

## 2018-08-06 DIAGNOSIS — E78.00 HYPERCHOLESTEROLEMIA: Chronic | ICD-10-CM

## 2018-08-06 NOTE — TELEPHONE ENCOUNTER
----- Message from John Flores sent at 8/6/2018  3:44 PM CDT -----  Contact: Yara [phone]  Patient wants to get rx for her Atorvastatin  40 mg, call it into [facility] mail order.

## 2018-08-07 RX ORDER — ATORVASTATIN CALCIUM 40 MG/1
TABLET, FILM COATED ORAL
Qty: 90 TABLET | Refills: 3 | Status: SHIPPED | OUTPATIENT
Start: 2018-08-07

## 2018-08-09 ENCOUNTER — TELEPHONE (OUTPATIENT)
Dept: FAMILY MEDICINE | Facility: CLINIC | Age: 73
End: 2018-08-09

## 2018-08-09 NOTE — TELEPHONE ENCOUNTER
----- Message from Indira Vora sent at 8/9/2018 12:46 PM CDT -----  Contact: self/[phone]  Would like to consult with nurse regarding medication(Atorvastatin 40mg), please call back at [phone]. Thanks/ar

## 2018-08-10 RX ORDER — AMITRIPTYLINE HYDROCHLORIDE 25 MG/1
TABLET, FILM COATED ORAL
Qty: 180 TABLET | Refills: 1 | Status: SHIPPED | OUTPATIENT
Start: 2018-08-10

## 2018-08-16 DIAGNOSIS — F41.9 ANXIETY: Chronic | ICD-10-CM

## 2018-08-17 RX ORDER — LORAZEPAM 1 MG/1
TABLET ORAL
Qty: 60 TABLET | Refills: 3 | Status: SHIPPED | OUTPATIENT
Start: 2018-08-17 | End: 2018-12-11 | Stop reason: SDUPTHER

## 2018-08-17 NOTE — TELEPHONE ENCOUNTER
----- Message from Herlinda Dietz sent at 8/17/2018 11:21 AM CDT -----  Contact: pt  Please call pt @ [phone], pt have some questions for nurse, pt will discuss with nurse.

## 2018-08-17 NOTE — TELEPHONE ENCOUNTER
----- Message from Jana Murcia MA sent at 8/16/2018  3:19 PM CDT -----  Contact: pt       ----- Message -----  From: Tootie Salinas  Sent: 8/16/2018   2:58 PM  To: Shanta Arevalo Staff    Pt states that she need a new script for LORazepam (ATIVAN) 1 MG tablet.   .[phone] (home)       [facility]   [address] [place] [address].  [facility] [address]  Phone: [phone] Fax: [phone]

## 2018-09-27 ENCOUNTER — LAB VISIT (OUTPATIENT)
Dept: LAB | Facility: HOSPITAL | Age: 73
End: 2018-09-27
Payer: MEDICARE

## 2018-09-27 ENCOUNTER — OFFICE VISIT (OUTPATIENT)
Dept: FAMILY MEDICINE | Facility: CLINIC | Age: 73
End: 2018-09-27
Payer: MEDICARE

## 2018-09-27 VITALS
TEMPERATURE: 98 F | SYSTOLIC BLOOD PRESSURE: 134 MMHG | HEART RATE: 68 BPM | WEIGHT: 149.5 LBS | BODY MASS INDEX: 29.35 KG/M2 | OXYGEN SATURATION: 98 % | HEIGHT: 60 IN | DIASTOLIC BLOOD PRESSURE: 72 MMHG

## 2018-09-27 DIAGNOSIS — I10 ESSENTIAL HYPERTENSION: ICD-10-CM

## 2018-09-27 DIAGNOSIS — F33.1 MODERATE EPISODE OF RECURRENT MAJOR DEPRESSIVE DISORDER: ICD-10-CM

## 2018-09-27 DIAGNOSIS — R73.03 PREDIABETES: ICD-10-CM

## 2018-09-27 DIAGNOSIS — Z23 NEED FOR VACCINATION AGAINST STREPTOCOCCUS PNEUMONIAE USING PNEUMOCOCCAL CONJUGATE VACCINE 13: ICD-10-CM

## 2018-09-27 DIAGNOSIS — E78.00 HYPERCHOLESTEROLEMIA: ICD-10-CM

## 2018-09-27 DIAGNOSIS — Z23 FLU VACCINE NEED: ICD-10-CM

## 2018-09-27 DIAGNOSIS — Z00.01 ENCOUNTER FOR GENERAL ADULT MEDICAL EXAMINATION WITH ABNORMAL FINDINGS: Primary | ICD-10-CM

## 2018-09-27 LAB
ALBUMIN SERPL BCP-MCNC: 3.7 G/DL
ALP SERPL-CCNC: 93 U/L
ALT SERPL W/O P-5'-P-CCNC: 23 U/L
ANION GAP SERPL CALC-SCNC: 9 MMOL/L
AST SERPL-CCNC: 30 U/L
BASOPHILS # BLD AUTO: 0.03 K/UL
BASOPHILS NFR BLD: 0.3 %
BILIRUB SERPL-MCNC: 0.6 MG/DL
BUN SERPL-MCNC: 19 MG/DL
CALCIUM SERPL-MCNC: 9.8 MG/DL
CHLORIDE SERPL-SCNC: 103 MMOL/L
CHOLEST SERPL-MCNC: 170 MG/DL
CHOLEST/HDLC SERPL: 3 {RATIO}
CO2 SERPL-SCNC: 29 MMOL/L
CREAT SERPL-MCNC: 0.9 MG/DL
DIFFERENTIAL METHOD: ABNORMAL
EOSINOPHIL # BLD AUTO: 0.3 K/UL
EOSINOPHIL NFR BLD: 3.9 %
ERYTHROCYTE [DISTWIDTH] IN BLOOD BY AUTOMATED COUNT: 14.6 %
EST. GFR  (AFRICAN AMERICAN): >60 ML/MIN/1.73 M^2
EST. GFR  (NON AFRICAN AMERICAN): >60 ML/MIN/1.73 M^2
ESTIMATED AVG GLUCOSE: 103 MG/DL
GLUCOSE SERPL-MCNC: 69 MG/DL
HBA1C MFR BLD HPLC: 5.2 %
HCT VFR BLD AUTO: 43.2 %
HDLC SERPL-MCNC: 57 MG/DL
HDLC SERPL: 33.5 %
HGB BLD-MCNC: 13.8 G/DL
IMM GRANULOCYTES # BLD AUTO: 0.03 K/UL
IMM GRANULOCYTES NFR BLD AUTO: 0.3 %
LDLC SERPL CALC-MCNC: 92.2 MG/DL
LYMPHOCYTES # BLD AUTO: 2.9 K/UL
LYMPHOCYTES NFR BLD: 33.4 %
MCH RBC QN AUTO: 28.7 PG
MCHC RBC AUTO-ENTMCNC: 31.9 G/DL
MCV RBC AUTO: 90 FL
MONOCYTES # BLD AUTO: 1 K/UL
MONOCYTES NFR BLD: 11.5 %
NEUTROPHILS # BLD AUTO: 4.4 K/UL
NEUTROPHILS NFR BLD: 50.6 %
NONHDLC SERPL-MCNC: 113 MG/DL
NRBC BLD-RTO: 0 /100 WBC
PLATELET # BLD AUTO: 203 K/UL
PMV BLD AUTO: 12.1 FL
POTASSIUM SERPL-SCNC: 4.4 MMOL/L
PROT SERPL-MCNC: 7.1 G/DL
RBC # BLD AUTO: 4.81 M/UL
SODIUM SERPL-SCNC: 141 MMOL/L
TRIGL SERPL-MCNC: 104 MG/DL
WBC # BLD AUTO: 8.71 K/UL

## 2018-09-27 PROCEDURE — 80061 LIPID PANEL: CPT

## 2018-09-27 PROCEDURE — 3078F DIAST BP <80 MM HG: CPT | Mod: CPTII,,, | Performed by: FAMILY MEDICINE

## 2018-09-27 PROCEDURE — 3075F SYST BP GE 130 - 139MM HG: CPT | Mod: CPTII,,, | Performed by: FAMILY MEDICINE

## 2018-09-27 PROCEDURE — 36415 COLL VENOUS BLD VENIPUNCTURE: CPT | Mod: PO

## 2018-09-27 PROCEDURE — 99999 PR PBB SHADOW E&M-EST. PATIENT-LVL IV: CPT | Mod: PBBFAC,,, | Performed by: FAMILY MEDICINE

## 2018-09-27 PROCEDURE — 90662 IIV NO PRSV INCREASED AG IM: CPT | Mod: PBBFAC,PO

## 2018-09-27 PROCEDURE — 85025 COMPLETE CBC W/AUTO DIFF WBC: CPT

## 2018-09-27 PROCEDURE — 80053 COMPREHEN METABOLIC PANEL: CPT

## 2018-09-27 PROCEDURE — 83036 HEMOGLOBIN GLYCOSYLATED A1C: CPT

## 2018-09-27 PROCEDURE — 99397 PER PM REEVAL EST PAT 65+ YR: CPT | Mod: S$PBB,,, | Performed by: FAMILY MEDICINE

## 2018-09-27 PROCEDURE — 99214 OFFICE O/P EST MOD 30 MIN: CPT | Mod: PBBFAC,PO,25 | Performed by: FAMILY MEDICINE

## 2018-09-27 NOTE — PROGRESS NOTES
Subjective:      Patient ID: aYra Pradhan is a 72 y.o. female.    Chief Complaint: Follow-up (labs and bp)      Past Medical History:   Diagnosis Date    Anxiety 2017    Arthritis     Back pain     Drug-induced constipation 2017    Hypercholesterolemia 2017    Hypertension     Moderate episode of recurrent major depressive disorder 2017    Osteoporosis     Seasonal allergic rhinitis due to pollen 2017    Sleep apnea     Tobacco dependence     Urinary incontinence      Past Surgical History:   Procedure Laterality Date    back surgeries, multiple N/A     HYSTERECTOMY      KATHLEEN    LAMINECTOMY THORACIC SPINE W/ PLACEMENT SPINAL CORD STIMULATOR      Dr. Luu manages     Family History   Problem Relation Age of Onset    Heart attack Mother     Hypertension Mother     Heart disease Mother     Heart attack Father     Hypertension Father     Heart disease Father     Diabetes Neg Hx     Cancer Neg Hx     Stroke Neg Hx     Kidney disease Neg Hx      Social History     Socioeconomic History    Marital status:      Spouse name: Not on file    Number of children: Not on file    Years of education: Not on file    Highest education level: Not on file   Social Needs    Financial resource strain: Not on file    Food insecurity - worry: Not on file    Food insecurity - inability: Not on file    Transportation needs - medical: Not on file    Transportation needs - non-medical: Not on file   Occupational History    Not on file   Tobacco Use    Smoking status: Former Smoker     Last attempt to quit: 2017     Years since quittin.3    Smokeless tobacco: Never Used   Substance and Sexual Activity    Alcohol use: Yes    Drug use: No    Sexual activity: No   Other Topics Concern    Not on file   Social History Narrative    Not on file       Current Outpatient Medications:     amitriptyline (ELAVIL) 25 MG tablet, TAKE 1 TABLET BY MOUTH AT NIGHT FOR 1 WEEK  THEN ONLY USE IF NEEDED. CAN ALSO INCREASE TO 2 BY MOUTH AT NIGHT, Disp: 180 tablet, Rfl: 1    ascorbic acid, vitamin C, (VITAMIN C) 500 MG tablet, Take 500 mg by mouth once daily., Disp: , Rfl:     aspirin (ECOTRIN) 81 MG EC tablet, Take 81 mg by mouth once daily., Disp: , Rfl:     atorvastatin (LIPITOR) 40 MG tablet, TAKE 1 TABLET ONE TIME DAILY, Disp: 90 tablet, Rfl: 3    carvedilol (COREG) 12.5 MG tablet, Take 1 tablet (12.5 mg total) by mouth 2 (two) times daily., Disp: 180 tablet, Rfl: 3    cranberry 400 mg Cap, Take 1 tablet by mouth., Disp: , Rfl:     dicyclomine (BENTYL) 10 MG capsule, take 1 capsule by mouth every 8 hours if needed, Disp: , Rfl: 0    diphenoxylate-atropine 2.5-0.025 mg (LOMOTIL) 2.5-0.025 mg per tablet, , Disp: , Rfl: 0    fish oil-omega-3 fatty acids 300-1,000 mg capsule, Take 2 g by mouth once daily., Disp: , Rfl:     Lactobacillus rhamnosus GG (CULTURELLE) 10 billion cell capsule, Take 1 capsule by mouth once daily., Disp: , Rfl:     LORazepam (ATIVAN) 1 MG tablet, TAKE 1 TABLET BY MOUTH TWICE A DAY, Disp: 60 tablet, Rfl: 3    losartan-hydrochlorothiazide 100-25 mg (HYZAAR) 100-25 mg per tablet, Take 1 tablet by mouth once daily., Disp: 30 tablet, Rfl: 3    montelukast (SINGULAIR) 10 mg tablet, TAKE 1 TABLET ONE TIME DAILY, Disp: 90 tablet, Rfl: 3    pantoprazole (PROTONIX) 40 MG tablet, TAKE 1 TABLET ONE TIME DAILY, Disp: 90 tablet, Rfl: 3    tramadol (ULTRAM) 50 mg tablet, , Disp: , Rfl:     venlafaxine (EFFEXOR-XR) 75 MG 24 hr capsule, TAKE 3 CAPSULES (225MG TOTAL) EVERY DAY, Disp: 90 capsule, Rfl: 1    vitamin D (VITAMIN D3) 1000 units Tab, Take 1,000 Units by mouth once daily., Disp: , Rfl:     pneumoc 13-stefani conj-dip cr,PF, (PREVNAR 13, PF,) 0.5 mL Syrg, Inject 0.5 mLs into the muscle once. for 1 dose, Disp: 0.5 mL, Rfl: 0  Review of patient's allergies indicates:   Allergen Reactions    Lortab [hydrocodone-acetaminophen] Anaphylaxis and Swelling    Codeine  Itching and Nausea Only    Percocet [oxycodone-acetaminophen] Nausea Only       Review of Systems   Constitutional: Negative for activity change and unexpected weight change.   HENT: Negative for hearing loss, rhinorrhea and trouble swallowing.    Eyes: Negative for discharge and visual disturbance.   Respiratory: Negative for chest tightness and wheezing.    Cardiovascular: Negative for chest pain and palpitations.   Gastrointestinal: Positive for constipation. Negative for blood in stool, diarrhea and vomiting.   Endocrine: Negative for polydipsia and polyuria.   Genitourinary: Negative for difficulty urinating, dysuria, hematuria and menstrual problem.   Musculoskeletal: Positive for arthralgias and joint swelling. Negative for neck pain.   Neurological: Negative for weakness and headaches.   Psychiatric/Behavioral: Negative for confusion and dysphoric mood.     HPI  Here today for annual exam.  BP's well controlled.  Colonoscopy up to date - Monique.  Labs due.  Depression well controlled.  No falls.  Doing well - no complaints.    Objective:   /72 (BP Location: Right arm, Patient Position: Sitting)   Pulse 68   Temp 97.8 °F (36.6 °C)   Ht 5' (1.524 m)   Wt 67.8 kg (149 lb 7.6 oz)   SpO2 98%   BMI 29.19 kg/m²      Physical Exam   Constitutional: She is oriented to person, place, and time. She is cooperative. No distress.   Eyes: Conjunctivae and EOM are normal.   Cardiovascular: Normal rate, regular rhythm and normal heart sounds.   Pulmonary/Chest: Effort normal and breath sounds normal.   Abdominal: Soft. She exhibits no distension. There is no tenderness.   Musculoskeletal: She exhibits no edema.   Neurological: She is alert and oriented to person, place, and time. Coordination and gait normal.   Skin: Skin is warm, dry and intact. No rash noted. She is not diaphoretic. No erythema.   Psychiatric: She has a normal mood and affect. Her speech is normal and behavior is normal.   Nursing note  and vitals reviewed.          Assessment:       1. Encounter for general adult medical examination with abnormal findings    2. Flu vaccine need    3. Moderate episode of recurrent major depressive disorder    4. Hypercholesterolemia    5. Essential hypertension    6. Prediabetes    7. Need for vaccination against Streptococcus pneumoniae using pneumococcal conjugate vaccine 13            Plan:         Encounter for general adult medical examination with abnormal findings  Comments:  Healthy diet and exercise d/w patient.  Labs ordered.  RTC in 6 months.  Call with results/refills.    Flu vaccine need    Moderate episode of recurrent major depressive disorder  Comments:  Well controlled.    Hypercholesterolemia  -     Lipid panel; Future; Expected date: 09/27/2018    Essential hypertension  Comments:  BP's well controlled.  Orders:  -     Comprehensive metabolic panel; Future; Expected date: 09/27/2018  -     CBC auto differential; Future; Expected date: 09/27/2018    Prediabetes  Comments:  Due for recheck.  Orders:  -     Hemoglobin A1c; Future; Expected date: 09/27/2018    Need for vaccination against Streptococcus pneumoniae using pneumococcal conjugate vaccine 13    Other orders  -     pneumoc 13-stefani conj-dip cr,PF, (PREVNAR 13, PF,) 0.5 mL Syrg; Inject 0.5 mLs into the muscle once. for 1 dose  Dispense: 0.5 mL; Refill: 0        Patient Care Team:  Naima Womack MD as PCP - General (Family Medicine)  Naima Womack MD as PCP - Benjamin LOOMIS/Willam Luu MD as Consulting Physician (Pain Medicine)  Francisco Amaya MD as Consulting Physician (Gastroenterology)  Xavi Ridley IV, MD (Vascular Surgery)  Lauro Lopez MD (Ophthalmology)  Garry Garcia LPN as Care Coordinator    Follow-up in about 6 months (around 3/27/2019) for review of bp/meds, review labs.

## 2018-09-27 NOTE — PATIENT INSTRUCTIONS
Mediterranean Food Guide   People who live in the area around the [place] have a lower risk of heart disease. Researchers have recently shown that following a Mediterranean diet decreases heart disease by 30% in people whom are at-risk.   This lifestyle is built upon daily exercise along with a lot of fruit, vegetables, plant-based proteins, whole grains, fish and smaller amounts of poultry and red meat. Fatty fish (salmon), olive oil, and nuts make this diet higher in fat than the classic heart healthy diet. These fats are mostly unsaturated, and when consumed in place of saturated fat, are good for the heart. The pyramid above and the chart on the next page describe the types of food and serving sizes in this heart healthy meal plan.   Physical Activity- The Mediterranean Diet pyramid is built upon daily physical activity and exercise. Aim for at least 150 minutes of moderate to vigorous exercise every week. Moderate-to-vigorous exercises include walking at a brisk pace, biking, or swimming, among other activities that elevate your heart rate. Always choose activities that you enjoy and that are safe, in order to be active throughout your life.   Achieve and Maintain a Healthy Weight - The high fat content of the Mediterranean is healthy for your heart, but may lead to increased energy intake and weight gain if you dont pay attention to how much you are eating. If you are trying to lose weight, choose the smaller number of servings from each food group, and try to make your serving sizes match those listed. 2   Food Groups and Servings per day  Serving Sizes    Non-starchy Vegetables   4-8 servings per day  One serving is ½ cup of cooked vegetables or 1 cup raw vegetables   Non-starchy vegetables include artichoke, asparagus, beets, broccoli, Jackson sprouts, cauliflower, cabbage, celery, carrots, tomatoes, eggplant, cucumber, onion, green and wax beans, zucchini, turnips, peppers, salad greens  and mushrooms. (Potatoes, peas, and corn are starchy vegetables.)    Fruit   2-4 servings per day  One serving is a small fresh fruit or ½ cup juice or ¼ cup dried fruit   Fresh fruits are preferred because of the fiber and other nutrients they contain. Fruits canned in light syrup or their own juice, and frozen fruit with little or no added sugar are also good choices. Use only small amounts of fruit juice (6 oz per day or less), since even unsweetened juices can contain as much sugar as regular soda.    Legumes and Nuts   1-3 servings per day  Legumes: One serving is ½ cup cooked kidney, black, garbanzo, prasad, soy (edamame), navy beans, split peas, or lentils, or ¼ cup fat free refried beans or baked beans   Nuts and Seeds: One serving is 2 Tbsp. sunflower or sesame seeds, 1 Tbsp. peanut butter,   7-8 walnuts or pecans, 20 peanuts, or 12-15 almonds   Aim for 1-2 servings of nuts or seeds and 1-2 servings of legumes per day. Legumes are high in fiber, protein, and minerals. Nuts are high in unsaturated fat, and may increase HDL without increasing LDL cholesterol levels.    Low-fat Dairy Products   1-3 servings per day  One serving is 1 cup of skim milk, non-fat yogurt, or 1oz of low-fat (part-skim) cheese   Calcium-fortified soy milk, soy yogurt, and soy cheese can take the place of dairy products. If servings of dairy or fortified soy are less than 2 per day, we advise a calcium and vitamin D supplement.    Fish or shellfish   2-3 times a week  One serving is 3 ounces (about the size of a deck of cards)   Cook fish by baking, sautéing, broiling, roasting, grilling, or poaching. Choose fatty fishes like salmon, herring, sardines, or mackerel often. The fat in fish is high in omega-3 fats, so it has healthy effects on triglycerides and blood cells.    Poultry, if desired   1-3 times a week  One serving is 3 ounces (about the size of a deck of cards)   Bake, sauté, stir hooper, roast, or grill the poultry you eat, and  eat it without the skin.    Whole Grains and Starchy Vegetables   4-6 servings per day  One serving is about 1 ounce of any of these:   1 slice whole wheat bread ½ cup potatoes, sweet potatoes, corn, or peas   ½ large whole grain bun 1 small whole grain roll   6-inch whole wheat raj 6 whole grain crackers   ½ cup cooked whole grain cereal (oatmeal, cracked wheat, quinoa)   ½ cup cooked whole wheat pasta, brown rice, or barley   Whole grains are high in fiber and have less effect on blood sugar and triglyceride levels than refined, processed grains like white bread and pasta. Whole grains also keep the stomach full longer, making it easier to control hunger.

## 2018-10-01 ENCOUNTER — TELEPHONE (OUTPATIENT)
Dept: FAMILY MEDICINE | Facility: CLINIC | Age: 73
End: 2018-10-01

## 2018-10-01 NOTE — TELEPHONE ENCOUNTER
----- Message from Linda Bates sent at 10/1/2018  4:43 PM CDT -----  Contact: pt  The pt states she has a ear ache, it also hurts when she chews, the pt asked that a antibiotic is called in, the pt can be reached at [phone]///[id number]

## 2018-12-11 DIAGNOSIS — F41.9 ANXIETY: Chronic | ICD-10-CM

## 2018-12-12 RX ORDER — LORAZEPAM 1 MG/1
1 TABLET ORAL 2 TIMES DAILY
Qty: 60 TABLET | Refills: 3 | Status: SHIPPED | OUTPATIENT
Start: 2018-12-12

## 2018-12-19 DIAGNOSIS — F33.1 MODERATE EPISODE OF RECURRENT MAJOR DEPRESSIVE DISORDER: Chronic | ICD-10-CM

## 2018-12-19 RX ORDER — VENLAFAXINE HYDROCHLORIDE 75 MG/1
CAPSULE, EXTENDED RELEASE ORAL
Qty: 90 CAPSULE | Refills: 1 | Status: SHIPPED | OUTPATIENT
Start: 2018-12-19

## 2018-12-19 NOTE — TELEPHONE ENCOUNTER
----- Message from Clementine Cheli sent at 12/19/2018 10:37 AM CST -----  Contact: self [phone]  1. What is the name of the medication you are requesting? Venlafaxine er  2. What is the dose? 75mg  3. How do you take the medication? Orally, topically, etc? orally  4. How often do you take this medication? daily  5. Do you need a 30 day or 90 day supply? 90 day  6. How many refills are you requesting? 3  7. What is your preferred pharmacy and location of the pharmacy?     [facility] - [facility] [id number] [place] [id number]  Phone: [phone] Fax: [phone]    8. Who can we contact with further questions? Pt [phone]    1. What is the name of the medication you are requesting? montelukast  2. What is the dose? 10mg  3. How do you take the medication? Orally, topically, etc? orally  4. How often do you take this medication? daily  5. Do you need a 30 day or 90 day supply? 90 day  6. How many refills are you requesting? 3  7. What is your preferred pharmacy and location of the pharmacy?     [facility] - [facility] [address] [facility]  [address] [address]  Phone: [phone] Fax: [phone]    8. Who can we contact with further questions? Pt [phone]

## 2025-07-29 NOTE — TELEPHONE ENCOUNTER
----- Message from Pamela Vyas sent at 12/11/2018  3:45 PM CST -----  Contact: pt  Pt stated she needs a refill on medication she can be reached at [phone] Thanks     1. What is the name of the medication you are requesting? generic for lorazepan   2. What is the dose? 1 mg  3. How do you take the medication? Orally, topically, etc? orally  4. How often do you take this medication? 2 a day  5. Do you need a 30 day or 90 day supply? 90  6. How many refills are you requesting?   7. What is your preferred pharmacy and location of the pharmacy?   8. Who can we contact with further questions?           [id number] - [place] - [address] [facility] [facility] [address] [facility] [address]  [address] [facility] [address] [place] [address]  Phone: [phone] Fax: [phone]     3rd attempt: LVM for pt to contact clinic. Pt has appt tomorrow with Dr. Crowley 7/30. Refill request denied.